# Patient Record
Sex: FEMALE | Race: BLACK OR AFRICAN AMERICAN | NOT HISPANIC OR LATINO | Employment: UNEMPLOYED | URBAN - METROPOLITAN AREA
[De-identification: names, ages, dates, MRNs, and addresses within clinical notes are randomized per-mention and may not be internally consistent; named-entity substitution may affect disease eponyms.]

---

## 2017-09-26 ENCOUNTER — OFFICE VISIT (OUTPATIENT)
Dept: ENDOCRINOLOGY | Facility: CLINIC | Age: 52
End: 2017-09-26
Payer: COMMERCIAL

## 2017-09-26 ENCOUNTER — LAB VISIT (OUTPATIENT)
Dept: LAB | Facility: HOSPITAL | Age: 52
End: 2017-09-26
Payer: COMMERCIAL

## 2017-09-26 VITALS
HEIGHT: 67 IN | RESPIRATION RATE: 16 BRPM | BODY MASS INDEX: 33.19 KG/M2 | WEIGHT: 211.44 LBS | SYSTOLIC BLOOD PRESSURE: 118 MMHG | DIASTOLIC BLOOD PRESSURE: 80 MMHG

## 2017-09-26 DIAGNOSIS — R00.2 HEART PALPITATIONS: ICD-10-CM

## 2017-09-26 DIAGNOSIS — I10 ESSENTIAL HYPERTENSION: ICD-10-CM

## 2017-09-26 DIAGNOSIS — E05.00 GRAVES DISEASE: ICD-10-CM

## 2017-09-26 LAB
25(OH)D3+25(OH)D2 SERPL-MCNC: 31 NG/ML
ALBUMIN SERPL BCP-MCNC: 3.3 G/DL
ALP SERPL-CCNC: 51 U/L
ALT SERPL W/O P-5'-P-CCNC: 10 U/L
ANION GAP SERPL CALC-SCNC: 6 MMOL/L
AST SERPL-CCNC: 12 U/L
BASOPHILS # BLD AUTO: 0.02 K/UL
BASOPHILS NFR BLD: 0.3 %
BILIRUB SERPL-MCNC: 0.3 MG/DL
BUN SERPL-MCNC: 15 MG/DL
CALCIUM SERPL-MCNC: 9.6 MG/DL
CHLORIDE SERPL-SCNC: 103 MMOL/L
CO2 SERPL-SCNC: 28 MMOL/L
CREAT SERPL-MCNC: 0.9 MG/DL
DIFFERENTIAL METHOD: ABNORMAL
EOSINOPHIL # BLD AUTO: 0.1 K/UL
EOSINOPHIL NFR BLD: 0.9 %
ERYTHROCYTE [DISTWIDTH] IN BLOOD BY AUTOMATED COUNT: 16.5 %
EST. GFR  (AFRICAN AMERICAN): >60 ML/MIN/1.73 M^2
EST. GFR  (NON AFRICAN AMERICAN): >60 ML/MIN/1.73 M^2
GLUCOSE SERPL-MCNC: 99 MG/DL
HCT VFR BLD AUTO: 40.2 %
HGB BLD-MCNC: 12.9 G/DL
LYMPHOCYTES # BLD AUTO: 2.3 K/UL
LYMPHOCYTES NFR BLD: 33.8 %
MCH RBC QN AUTO: 25.9 PG
MCHC RBC AUTO-ENTMCNC: 32.1 G/DL
MCV RBC AUTO: 81 FL
MONOCYTES # BLD AUTO: 0.3 K/UL
MONOCYTES NFR BLD: 4.4 %
NEUTROPHILS # BLD AUTO: 4.1 K/UL
NEUTROPHILS NFR BLD: 60.6 %
PLATELET # BLD AUTO: 256 K/UL
PMV BLD AUTO: 11 FL
POTASSIUM SERPL-SCNC: 3.7 MMOL/L
PROT SERPL-MCNC: 7.1 G/DL
RBC # BLD AUTO: 4.99 M/UL
SODIUM SERPL-SCNC: 137 MMOL/L
T4 FREE SERPL-MCNC: 0.89 NG/DL
TSH SERPL DL<=0.005 MIU/L-ACNC: 0.27 UIU/ML
WBC # BLD AUTO: 6.75 K/UL

## 2017-09-26 PROCEDURE — 85025 COMPLETE CBC W/AUTO DIFF WBC: CPT

## 2017-09-26 PROCEDURE — 99999 PR PBB SHADOW E&M-NEW PATIENT-LVL III: CPT | Mod: PBBFAC,,, | Performed by: INTERNAL MEDICINE

## 2017-09-26 PROCEDURE — 84443 ASSAY THYROID STIM HORMONE: CPT

## 2017-09-26 PROCEDURE — 84439 ASSAY OF FREE THYROXINE: CPT

## 2017-09-26 PROCEDURE — 80053 COMPREHEN METABOLIC PANEL: CPT

## 2017-09-26 PROCEDURE — 99204 OFFICE O/P NEW MOD 45 MIN: CPT | Mod: S$GLB,,, | Performed by: INTERNAL MEDICINE

## 2017-09-26 PROCEDURE — 3008F BODY MASS INDEX DOCD: CPT | Mod: S$GLB,,, | Performed by: INTERNAL MEDICINE

## 2017-09-26 PROCEDURE — 82306 VITAMIN D 25 HYDROXY: CPT

## 2017-09-26 PROCEDURE — 83520 IMMUNOASSAY QUANT NOS NONAB: CPT

## 2017-09-26 RX ORDER — METOPROLOL SUCCINATE 100 MG/1
100 TABLET, EXTENDED RELEASE ORAL 2 TIMES DAILY
COMMUNITY
End: 2020-09-02

## 2017-09-26 NOTE — LETTER
October 1, 2017      Duong Ravi MD  [address] [place] [address]           [facility] - Endo/Diab/Metab  [address]  [facility] [address]  Phone: [phone]  Fax: [phone]          Patient: Jaimie Goldberg   MR Number: [id number]   YOB: 1965   Date of Visit: 9/26/2017       Dear Dr. Duong Ravi:    Thank you for referring Jaimie Goldberg to me for evaluation. Attached you will find relevant portions of my assessment and plan of care.    If you have questions, please do not hesitate to call me. I look forward to following Jaimie Goldberg along with you.    Sincerely,    Dorcas Hatfield, NP    Enclosure  CC:  No Recipients    If you would like to receive this communication electronically, please contact [email or web address] or [phone] to request more information on [employer] access.    For providers and/or their staff who would like to refer a patient to [facility], please contact us through our one-stop-shop provider referral line, [facility], at [phone].    If you feel you have received this communication in error or would no longer like to receive these types of communications, please e-mail [email or web address]

## 2017-09-26 NOTE — PROGRESS NOTES
Subjective:       Patient ID: Jaimie Goldberg is a 52 y.o. female.    Chief Complaint: Graves' Disease    HPI   Ms. Goldberg is here today for evaluation of Hyperthyroidism   She is from [place], [place]     She is referred to our office by an outside PCP     She presented to her PCP with c/o palpitations which prompted initial evaluation of TFTs   She was found to have a suppressed TSH level     Repeat labs including thyroid antibodies showed an elevated TRAB     She was also referred to Cardiology and is currently wearing a holter monitor due to c/o palpitations    The patient states palpitations mainly occur at night or while at rest   She denies weight loss   Denies tremors   Denies hair loss or thinning nails   Denies heat intolerance   Denies anxiety   Denies tremors   Denies vision changes   Denies diarrhea     She endorses weight gain of 20 pounds since 10/2016     The patient denies fever or sore throat   Denies recent contrast   Denies use of amiodarone     There is no known family history of thyroid disease or thyroid cancer     Review of Systems   Constitutional: Positive for unexpected weight change. Negative for fatigue.   HENT: Negative for sore throat, trouble swallowing and voice change.    Eyes: Negative for visual disturbance.   Respiratory: Negative for shortness of breath.    Cardiovascular: Positive for palpitations. Negative for chest pain.   Gastrointestinal: Negative for abdominal pain, diarrhea, nausea and vomiting.   Endocrine: Negative for cold intolerance and heat intolerance.   Genitourinary: Negative for dysuria.   Musculoskeletal: Negative for arthralgias and neck pain.   Skin: Negative for wound.   Neurological: Negative for tremors and headaches.   Hematological: Does not bruise/bleed easily.   Psychiatric/Behavioral: Negative for confusion and sleep disturbance.       Objective:      Physical Exam   Constitutional: She is oriented to person, place, and time. She appears  well-developed. No distress.   HENT:   Right Ear: External ear normal.   Left Ear: External ear normal.   Nose: Nose normal.   Hearing normal  Dentition good    Eyes:   No exophthalmos     Neck: No tracheal deviation present. No thyromegaly present.   No bruit   No palpable thyroid nodules    Cardiovascular: Normal rate and regular rhythm.    No murmur heard.  Pulmonary/Chest: Effort normal and breath sounds normal.   Abdominal: Soft. There is no tenderness. No hernia.   Musculoskeletal: Normal range of motion. She exhibits no edema.   Gait normal  No clubbing or cyanosis noted   Lymphadenopathy:     She has no cervical adenopathy.   Neurological: She is alert and oriented to person, place, and time.   Skin: Skin is warm and dry. No rash noted.   No nodules       Psychiatric: She has a normal mood and affect. Judgment normal.   Nursing note and vitals reviewed.      Labs 9/14/2017:  From [facility]   TSH - 0.296 uIU/mL ( 0.358-3.740)  Free T4 - 0.89 ng/mL ( 0.76-1.46)  TRAB - 2.13 IU/L ( 0.00-1.75)    Assessment:       1. Graves disease    2. Essential hypertension    3. Heart palpitations    4. Menopausal disorder        Plan:       1. Graves disease - with positive TRAB on outside labs   -- recommend to repeat labs today including TRAB   -- differential includes toxic nodule, thyroiditis   -- discussed treatment options including use of ATDs, STEWARD or surgery with surgery being the least desirable   -- if repeat TRAB is positive, will start Methimazole. Discussed use and possible side effects  -- if repeat TRAB is negative, will plan for NM thyroid uptake and scan  -- cause specific treatment options and associated risks discussed with patient    -- discussed the chance of Graves remission (30%) after 2 years of ATD therapy - with a 50% chance of recurrence  -- check vitamin D and BMD as hyperthyroidism can accelerate bone loss     2. HTN   -- stable and controlled with current  regimen   -- encouraged to follow a low salt diet     3. Heart palpitations   -- continue to follow with outside cardiology as previously advised by outside PCP       Labs today   We will notify the patient with results       Case discussed in consultation with Dr. Wilson   Recommendations were discussed with the patient in detail  The patient verbalized understanding and agrees with the plan outlined as above.       Orders Placed This Encounter   Procedures    DXA Bone Density Spine And Hip     Standing Status:   Future     Standing Expiration Date:   10/1/2018     Order Specific Question:   Is the patient pregnant?     Answer:   No     Order Specific Question:   May the Radiologist modify the order per protocol to meet the clinical needs of the patient?     Answer:   Yes    TSH     Standing Status:   Future     Number of Occurrences:   1     Standing Expiration Date:   9/21/2018    T4, free     Standing Status:   Future     Number of Occurrences:   1     Standing Expiration Date:   11/25/2018    Thyrotropin receptor antibody     Standing Status:   Future     Number of Occurrences:   1     Standing Expiration Date:   11/25/2018    Vitamin D     Standing Status:   Future     Number of Occurrences:   1     Standing Expiration Date:   11/25/2018    Comprehensive metabolic panel     Standing Status:   Future     Number of Occurrences:   1     Standing Expiration Date:   9/21/2018    CBC auto differential     Standing Status:   Future     Number of Occurrences:   1     Standing Expiration Date:   9/21/2018

## 2017-09-28 LAB — TSH RECEP AB SER-ACNC: 1.58 IU/L

## 2017-10-01 ENCOUNTER — TELEPHONE (OUTPATIENT)
Dept: ENDOCRINOLOGY | Facility: CLINIC | Age: 52
End: 2017-10-01

## 2017-10-01 DIAGNOSIS — E05.90 HYPERTHYROIDISM: Primary | ICD-10-CM

## 2017-10-01 PROBLEM — R00.2 HEART PALPITATIONS: Status: ACTIVE | Noted: 2017-10-01

## 2017-10-01 PROBLEM — N95.9 MENOPAUSAL DISORDER: Status: ACTIVE | Noted: 2017-10-01

## 2017-10-01 PROBLEM — I10 HYPERTENSION: Status: ACTIVE | Noted: 2017-10-01

## 2017-10-09 NOTE — TELEPHONE ENCOUNTER
Spoke with pt. Pt would like a handout mailed to her to get more information on the uptake and scan before she decides. She would also like to speak with Mrs. Kaycee Driscoll with other questions.

## 2017-10-12 NOTE — TELEPHONE ENCOUNTER
Patient notified via phone. Discussed test results in detail. TRAB is negative with our assay. Will plan for NM uptake and scan. Discussed procedure in detail she voiced understanding. Requests to be scheduled ASAP. Also request info to be mailed.

## 2017-10-17 ENCOUNTER — TELEPHONE (OUTPATIENT)
Dept: ENDOCRINOLOGY | Facility: CLINIC | Age: 52
End: 2017-10-17

## 2017-10-17 NOTE — TELEPHONE ENCOUNTER
----- Message from [place] sent at 10/16/2017  2:46 PM CDT -----  Contact: self  Pt would like to discuss scheduling her procedure for her thyroid and the bone density.  She can be reached at [phone] or [phone]

## 2017-10-20 ENCOUNTER — TELEPHONE (OUTPATIENT)
Dept: ENDOCRINOLOGY | Facility: CLINIC | Age: 52
End: 2017-10-20

## 2017-10-20 NOTE — TELEPHONE ENCOUNTER
----- Message from No Lorenzo sent at 10/20/2017  2:32 PM CDT -----  Contact: self  Pt would like to be called back regarding a medicine, and a thyroid scan that is supposed to be done. Pt stated that she would like a call back asap because it is very important.    Pt can be contacted at [phone].

## 2017-10-25 ENCOUNTER — TELEPHONE (OUTPATIENT)
Dept: RADIOLOGY | Facility: HOSPITAL | Age: 52
End: 2017-10-25

## 2017-10-26 ENCOUNTER — HOSPITAL ENCOUNTER (OUTPATIENT)
Dept: RADIOLOGY | Facility: HOSPITAL | Age: 52
Discharge: HOME | End: 2017-10-26
Attending: INTERNAL MEDICINE
Payer: COMMERCIAL

## 2017-10-26 ENCOUNTER — HOSPITAL ENCOUNTER (OUTPATIENT)
Dept: RADIOLOGY | Facility: CLINIC | Age: 52
Discharge: HOME | End: 2017-10-26
Attending: INTERNAL MEDICINE
Payer: COMMERCIAL

## 2017-10-26 DIAGNOSIS — E05.00 GRAVES DISEASE: ICD-10-CM

## 2017-10-26 DIAGNOSIS — E05.90 HYPERTHYROIDISM: ICD-10-CM

## 2017-10-26 PROCEDURE — 77080 DXA BONE DENSITY AXIAL: CPT | Mod: 26,,, | Performed by: INTERNAL MEDICINE

## 2017-10-26 PROCEDURE — 78014 THYROID IMAGING W/BLOOD FLOW: CPT | Mod: 26,,, | Performed by: RADIOLOGY

## 2017-10-26 PROCEDURE — 77080 DXA BONE DENSITY AXIAL: CPT | Mod: TC

## 2017-10-27 ENCOUNTER — TELEPHONE (OUTPATIENT)
Dept: ENDOCRINOLOGY | Facility: CLINIC | Age: 52
End: 2017-10-27

## 2017-10-27 ENCOUNTER — HOSPITAL ENCOUNTER (OUTPATIENT)
Dept: RADIOLOGY | Facility: HOSPITAL | Age: 52
Discharge: HOME | End: 2017-10-27
Attending: INTERNAL MEDICINE
Payer: COMMERCIAL

## 2017-10-27 DIAGNOSIS — E05.10 TOXIC ADENOMA: ICD-10-CM

## 2017-10-27 PROBLEM — E05.00 GRAVES DISEASE: Status: RESOLVED | Noted: 2017-09-26 | Resolved: 2017-10-27

## 2017-10-27 PROBLEM — E05.90 HYPERTHYROIDISM: Status: RESOLVED | Noted: 2017-10-01 | Resolved: 2017-10-27

## 2017-10-27 PROCEDURE — A9509 IODINE I-123 SOD IODIDE MIL: HCPCS

## 2017-10-27 NOTE — TELEPHONE ENCOUNTER
Patient notified of NM thyroid uptake and scan results which revealed 2 hyperfunctioning thyroid nodules on the right. Recommended treatment dose is 33 mCi of I-131 orally. Discussed in detail with the patient and her spouse. They both verbalized understanding. Will proceed with I-131

## 2017-11-01 ENCOUNTER — TELEPHONE (OUTPATIENT)
Dept: ENDOCRINOLOGY | Facility: CLINIC | Age: 52
End: 2017-11-01

## 2017-11-01 DIAGNOSIS — E05.10 TOXIC ADENOMA: Primary | ICD-10-CM

## 2017-11-03 ENCOUNTER — TELEPHONE (OUTPATIENT)
Dept: ENDOCRINOLOGY | Facility: CLINIC | Age: 52
End: 2017-11-03

## 2017-11-03 DIAGNOSIS — E05.10 TOXIC ADENOMA: Primary | ICD-10-CM

## 2017-11-15 ENCOUNTER — TELEPHONE (OUTPATIENT)
Dept: RADIOLOGY | Facility: HOSPITAL | Age: 52
End: 2017-11-15

## 2017-11-15 DIAGNOSIS — E05.10 TOXIC ADENOMA: Primary | ICD-10-CM

## 2017-11-16 ENCOUNTER — HOSPITAL ENCOUNTER (OUTPATIENT)
Dept: RADIOLOGY | Facility: HOSPITAL | Age: 52
Discharge: HOME | End: 2017-11-16
Attending: INTERNAL MEDICINE
Payer: COMMERCIAL

## 2017-11-16 DIAGNOSIS — E05.10 TOXIC ADENOMA: ICD-10-CM

## 2017-11-16 PROCEDURE — 79005 NUCLEAR RX ORAL ADMIN: CPT | Mod: 26,,, | Performed by: NUCLEAR MEDICINE

## 2017-11-16 PROCEDURE — 79005 NUCLEAR RX ORAL ADMIN: CPT | Mod: TC

## 2017-11-16 NOTE — TELEPHONE ENCOUNTER
Received a call from Joyce in Nuclear Medicine regarding patient being here for I131 states she had a hysterectomy, however there is no record in the chart.  I spoke with Dorcas Driscoll NP which explains that, she ordered a pregnancy test for patient.  I lost connection with Joyce and tried calling her back,, left voice mail message on NM ext. # [phone] for Joyce to call me back if she has any questions.

## 2017-11-17 ENCOUNTER — TELEPHONE (OUTPATIENT)
Dept: ENDOCRINOLOGY | Facility: CLINIC | Age: 52
End: 2017-11-17

## 2017-11-17 DIAGNOSIS — E05.10 TOXIC ADENOMA: Primary | ICD-10-CM

## 2017-11-20 ENCOUNTER — TELEPHONE (OUTPATIENT)
Dept: ENDOCRINOLOGY | Facility: CLINIC | Age: 52
End: 2017-11-20

## 2017-11-20 DIAGNOSIS — E05.10 TOXIC ADENOMA: Primary | ICD-10-CM

## 2017-11-20 NOTE — TELEPHONE ENCOUNTER
----- Message from Meche Kaiser sent at 11/20/2017  9:01 AM CST -----  Contact: Self [phone]   PT called for next steps after radioactive pill

## 2017-12-04 ENCOUNTER — TELEPHONE (OUTPATIENT)
Dept: ENDOCRINOLOGY | Facility: CLINIC | Age: 52
End: 2017-12-04

## 2017-12-04 NOTE — TELEPHONE ENCOUNTER
----- Message from Bhavin Zavala sent at 12/4/2017  3:00 PM CST -----  Contact: patient   Patient called in requesting to speak with Dr. Hatfield. Patient called stating that its very important that she talks with her. Patient's number is [phone]. Thank You.

## 2017-12-05 ENCOUNTER — TELEPHONE (OUTPATIENT)
Dept: ENDOCRINOLOGY | Facility: CLINIC | Age: 52
End: 2017-12-05

## 2017-12-05 NOTE — TELEPHONE ENCOUNTER
----- Message from Gricelda Patel sent at 12/5/2017  1:03 PM CST -----  Contact: Pt   [phone]  Bon   -   Pt needs to speak with the nurse , no additional information was provided call back to discuss

## 2017-12-05 NOTE — TELEPHONE ENCOUNTER
----- Message from Meche Kaiser sent at 12/5/2017  8:10 AM CST -----  Contact: Self   PT is requesting a call. She states her heart is racing and she has not been able to sleep.     [phone] (h)  [phone] (m)

## 2017-12-06 ENCOUNTER — TELEPHONE (OUTPATIENT)
Dept: ENDOCRINOLOGY | Facility: CLINIC | Age: 52
End: 2017-12-06

## 2017-12-06 DIAGNOSIS — E66.9 OBESITY (BMI 30.0-34.9): Primary | ICD-10-CM

## 2017-12-06 NOTE — TELEPHONE ENCOUNTER
"Patient notified via phone. She states she is experiencing "racing heart" at night before going to bed. I asked patient to assess her pulse rate - she states current pulse is 69 bpm. Patient informed to take Toprol bid.  She is scheduled for follow up labs on 12/15/2017.  Advised to notify office on Friday if she continues to experience palpitations with the increase in Toprol. She voiced understanding of the above information   "

## 2017-12-14 ENCOUNTER — TELEPHONE (OUTPATIENT)
Dept: ENDOCRINOLOGY | Facility: CLINIC | Age: 52
End: 2017-12-14

## 2017-12-14 NOTE — TELEPHONE ENCOUNTER
----- Message from Gricelda Patel sent at 12/14/2017 12:34 PM CST -----  Contact: Pt   [phone]  Bon  /   Patient  Calling to verify orders for labs that schedule on 12/15, call the pt back to verify

## 2017-12-15 ENCOUNTER — LAB VISIT (OUTPATIENT)
Dept: LAB | Facility: HOSPITAL | Age: 52
End: 2017-12-15
Payer: COMMERCIAL

## 2017-12-15 DIAGNOSIS — E66.9 OBESITY (BMI 30.0-34.9): ICD-10-CM

## 2017-12-15 DIAGNOSIS — E05.10 TOXIC ADENOMA: ICD-10-CM

## 2017-12-15 LAB
ESTIMATED AVG GLUCOSE: 108 MG/DL
HBA1C MFR BLD HPLC: 5.4 %
T4 FREE SERPL-MCNC: 1.29 NG/DL
TSH SERPL DL<=0.005 MIU/L-ACNC: <0.01 UIU/ML

## 2017-12-15 PROCEDURE — 84439 ASSAY OF FREE THYROXINE: CPT

## 2017-12-15 PROCEDURE — 84443 ASSAY THYROID STIM HORMONE: CPT

## 2017-12-15 PROCEDURE — 83036 HEMOGLOBIN GLYCOSYLATED A1C: CPT

## 2017-12-15 PROCEDURE — 36415 COLL VENOUS BLD VENIPUNCTURE: CPT

## 2017-12-18 ENCOUNTER — TELEPHONE (OUTPATIENT)
Dept: ENDOCRINOLOGY | Facility: CLINIC | Age: 52
End: 2017-12-18

## 2017-12-18 DIAGNOSIS — E05.10 TOXIC ADENOMA: Primary | ICD-10-CM

## 2018-01-02 ENCOUNTER — OFFICE VISIT (OUTPATIENT)
Dept: ENDOCRINOLOGY | Facility: CLINIC | Age: 53
End: 2018-01-02
Payer: COMMERCIAL

## 2018-01-02 VITALS
SYSTOLIC BLOOD PRESSURE: 118 MMHG | HEART RATE: 68 BPM | HEIGHT: 67 IN | BODY MASS INDEX: 33.74 KG/M2 | RESPIRATION RATE: 16 BRPM | DIASTOLIC BLOOD PRESSURE: 78 MMHG | WEIGHT: 214.94 LBS

## 2018-01-02 DIAGNOSIS — E05.10 TOXIC ADENOMA: ICD-10-CM

## 2018-01-02 DIAGNOSIS — Z92.3 STATUS POST RADIOACTIVE IODINE THYROID ABLATION: Primary | ICD-10-CM

## 2018-01-02 PROCEDURE — 99214 OFFICE O/P EST MOD 30 MIN: CPT | Mod: S$GLB,,, | Performed by: INTERNAL MEDICINE

## 2018-01-02 PROCEDURE — 99999 PR PBB SHADOW E&M-EST. PATIENT-LVL III: CPT | Mod: PBBFAC,,, | Performed by: INTERNAL MEDICINE

## 2018-01-02 NOTE — PROGRESS NOTES
Subjective:       Patient ID: Jaimie Goldberg is a 53 y.o. female.    Chief Complaint: Graves' Disease    HPI   Ms. Goldberg is here today s/p STEWARD for toxic adenoma. Thyroid uptake and scan identified 2 hyperfunctioning thyroid nodules within the right thyroid lobe. Uptake was 17.5%     She opted for STEWARD and received 34.0 mCi of I-131 orally on 11/16/2017     The patient tolerated the procedure well  without difficulty     She denies palpitations at this time   Denies unexpected changes in her weight     She denies constipation or diarrhea   Denies dry skin or hair loss     She denies anxiety or tremors     She denies family history of thyroid disease or thyroid cancer     Denies prior history of radiation exposure to her head, face or neck     She denies dysphagia   Denies shortness of breath in the recumbent position   Denies voice changes     She denies anterior neck pain or pressure     Review of Systems   Constitutional: Positive for unexpected weight change. Negative for fatigue.   HENT: Negative for sore throat, trouble swallowing and voice change.    Eyes: Negative for visual disturbance.   Respiratory: Negative for shortness of breath.    Cardiovascular: Negative for chest pain and palpitations.   Gastrointestinal: Negative for abdominal pain, diarrhea, nausea and vomiting.   Endocrine: Negative for cold intolerance and heat intolerance.   Genitourinary: Negative for dysuria.   Musculoskeletal: Negative for arthralgias and neck pain.   Skin: Negative for wound.   Neurological: Negative for tremors and headaches.   Psychiatric/Behavioral: Negative for sleep disturbance. The patient is not nervous/anxious.        Objective:      Physical Exam   Constitutional: She is oriented to person, place, and time. She appears well-developed. No distress.   Eyes: EOM are normal. Pupils are equal, round, and reactive to light.        Neck: No tracheal deviation present. No thyromegaly present.   No bruit   No palpable  thyroid nodules    Cardiovascular: Normal rate and regular rhythm.    No murmur heard.  Pulmonary/Chest: Effort normal.   Musculoskeletal: Normal range of motion. She exhibits no edema.   Lymphadenopathy:     She has no cervical adenopathy.   Neurological: She is alert and oriented to person, place, and time.   Skin: Skin is warm and dry. No rash noted.   No nodules       Psychiatric: She has a normal mood and affect. Her behavior is normal.   Nursing note and vitals reviewed.      Labs 9/14/2017:  From [facility]   TSH - 0.296 uIU/mL ( 0.358-3.740)  Free T4 - 0.89 ng/mL ( 0.76-1.46)  TRAB - 2.13 IU/L ( 0.00-1.75)      Results for ALBERTA VENTURA (MRN [id number]) as of 1/2/2018 15:38   Ref. Range 9/26/2017 17:02 11/16/2017 15:45 12/15/2017 14:20   TSH Latest Ref Range: 0.400 - 4.000 uIU/mL 0.267 (L)  <0.010 (L)   Free T4 Latest Ref Range: 0.71 - 1.51 ng/dL 0.89  1.29   Thyrotropin Receptor Ab Latest Ref Range: 0.00 - 1.75 IU/L 1.58       NM thyroid uptake and scan 10/27/2017:   Impression    2 hyperfunctioning nodules on the right. Uptake 17.5%. Recommended treatment dose 33 mCi iodine-131 orally.       Assessment:       1. Status post radioactive iodine thyroid ablation    2. Toxic adenoma        Plan:       1. Status post radioactive iodine thyroid treatment   -- doing well post therapy   -- monitor TFTs periodically   -- reviewed signs and symptoms of hypothyroidism   -- handouts provided   -- check labs in 4 weeks     2. Toxic adenoma   -- status post I-131 orally   -- obtain thyroid ultrasound for surveillance   -- reviewed indications for FNA: interval change, compressive symptoms       Orders Placed This Encounter   Procedures    US Soft Tissue Head Neck Thyroid     Standing Status:   Future     Standing Expiration Date:   1/2/2019     Order Specific Question:   May the Radiologist modify the order per protocol to meet the clinical needs of the patient?     Answer:   Yes     TSH     Standing Status:   Future     Standing Expiration Date:   12/28/2018    T4, free     Standing Status:   Future     Standing Expiration Date:   3/3/2019

## 2018-01-30 ENCOUNTER — LAB VISIT (OUTPATIENT)
Dept: LAB | Facility: HOSPITAL | Age: 53
End: 2018-01-30
Payer: COMMERCIAL

## 2018-01-30 ENCOUNTER — HOSPITAL ENCOUNTER (OUTPATIENT)
Dept: ENDOCRINOLOGY | Facility: CLINIC | Age: 53
Discharge: HOME | End: 2018-01-30
Attending: INTERNAL MEDICINE
Payer: COMMERCIAL

## 2018-01-30 DIAGNOSIS — E05.10 TOXIC ADENOMA: ICD-10-CM

## 2018-01-30 DIAGNOSIS — Z92.3 STATUS POST RADIOACTIVE IODINE THYROID ABLATION: ICD-10-CM

## 2018-01-30 LAB
T4 FREE SERPL-MCNC: 0.52 NG/DL
TSH SERPL DL<=0.005 MIU/L-ACNC: 1.78 UIU/ML

## 2018-01-30 PROCEDURE — 84439 ASSAY OF FREE THYROXINE: CPT

## 2018-01-30 PROCEDURE — 84443 ASSAY THYROID STIM HORMONE: CPT

## 2018-01-30 PROCEDURE — 76536 US EXAM OF HEAD AND NECK: CPT | Mod: S$GLB,,, | Performed by: INTERNAL MEDICINE

## 2018-01-30 PROCEDURE — 36415 COLL VENOUS BLD VENIPUNCTURE: CPT

## 2018-01-31 ENCOUNTER — TELEPHONE (OUTPATIENT)
Dept: ENDOCRINOLOGY | Facility: CLINIC | Age: 53
End: 2018-01-31

## 2018-01-31 DIAGNOSIS — E89.0 POSTABLATIVE HYPOTHYROIDISM: ICD-10-CM

## 2018-01-31 RX ORDER — LEVOTHYROXINE SODIUM 50 UG/1
50 TABLET ORAL
Qty: 30 TABLET | Refills: 6 | Status: SHIPPED | OUTPATIENT
Start: 2018-01-31 | End: 2018-02-28 | Stop reason: DRUGHIGH

## 2018-02-01 ENCOUNTER — TELEPHONE (OUTPATIENT)
Dept: ENDOCRINOLOGY | Facility: CLINIC | Age: 53
End: 2018-02-01

## 2018-02-28 ENCOUNTER — TELEPHONE (OUTPATIENT)
Dept: ENDOCRINOLOGY | Facility: CLINIC | Age: 53
End: 2018-02-28

## 2018-02-28 ENCOUNTER — LAB VISIT (OUTPATIENT)
Dept: LAB | Facility: HOSPITAL | Age: 53
End: 2018-02-28
Payer: COMMERCIAL

## 2018-02-28 DIAGNOSIS — E89.0 POSTABLATIVE HYPOTHYROIDISM: Primary | ICD-10-CM

## 2018-02-28 DIAGNOSIS — E89.0 POSTABLATIVE HYPOTHYROIDISM: ICD-10-CM

## 2018-02-28 LAB
T4 FREE SERPL-MCNC: 0.72 NG/DL
TSH SERPL DL<=0.005 MIU/L-ACNC: 14.06 UIU/ML

## 2018-02-28 PROCEDURE — 84443 ASSAY THYROID STIM HORMONE: CPT

## 2018-02-28 PROCEDURE — 84439 ASSAY OF FREE THYROXINE: CPT

## 2018-02-28 PROCEDURE — 36415 COLL VENOUS BLD VENIPUNCTURE: CPT

## 2018-02-28 RX ORDER — LEVOTHYROXINE SODIUM 75 UG/1
75 TABLET ORAL
Qty: 30 TABLET | Refills: 6 | Status: SHIPPED | OUTPATIENT
Start: 2018-02-28 | End: 2018-04-13 | Stop reason: DRUGHIGH

## 2018-03-05 ENCOUNTER — TELEPHONE (OUTPATIENT)
Dept: ENDOCRINOLOGY | Facility: CLINIC | Age: 53
End: 2018-03-05

## 2018-03-05 NOTE — TELEPHONE ENCOUNTER
----- Message from Gricelda Patel sent at 3/2/2018  2:54 PM CST -----  Contact: Self  [phone]  Bon  /   Patient  Needs to speak with the nurse in regards to result of last last , call the pt back to discuss   Thanks,

## 2018-03-05 NOTE — TELEPHONE ENCOUNTER
----- Message from Ela Justice sent at 3/5/2018  3:41 PM CST -----  Contact: Jaimie     tel: cell:  [phone]   Pt.says she would like for CONCHIS to return her call.    Conchis had left her a msg. For pt. To call her.  Asking for  Test results.

## 2018-03-06 ENCOUNTER — TELEPHONE (OUTPATIENT)
Dept: ENDOCRINOLOGY | Facility: CLINIC | Age: 53
End: 2018-03-06

## 2018-03-06 NOTE — TELEPHONE ENCOUNTER
----- Message from Ela Justice sent at 3/6/2018  2:38 PM CST -----  Contact: Mago    tel:   [phone]   Caller says she needs info on her lab work that you gave her.    Would like to speak to you to discuss the results so that she can understand them.

## 2018-04-12 ENCOUNTER — LAB VISIT (OUTPATIENT)
Dept: LAB | Facility: HOSPITAL | Age: 53
End: 2018-04-12
Payer: COMMERCIAL

## 2018-04-12 DIAGNOSIS — E89.0 POSTABLATIVE HYPOTHYROIDISM: ICD-10-CM

## 2018-04-12 LAB
T4 FREE SERPL-MCNC: 0.97 NG/DL
TSH SERPL DL<=0.005 MIU/L-ACNC: 7.78 UIU/ML

## 2018-04-12 PROCEDURE — 84443 ASSAY THYROID STIM HORMONE: CPT

## 2018-04-12 PROCEDURE — 36415 COLL VENOUS BLD VENIPUNCTURE: CPT

## 2018-04-12 PROCEDURE — 84439 ASSAY OF FREE THYROXINE: CPT

## 2018-04-13 ENCOUNTER — TELEPHONE (OUTPATIENT)
Dept: ENDOCRINOLOGY | Facility: CLINIC | Age: 53
End: 2018-04-13

## 2018-04-13 DIAGNOSIS — E89.0 POSTABLATIVE HYPOTHYROIDISM: Primary | ICD-10-CM

## 2018-04-13 RX ORDER — LEVOTHYROXINE SODIUM 88 UG/1
88 TABLET ORAL
Qty: 30 TABLET | Refills: 6 | Status: SHIPPED | OUTPATIENT
Start: 2018-04-13 | End: 2018-07-12 | Stop reason: SDUPTHER

## 2018-04-13 NOTE — TELEPHONE ENCOUNTER
Patient notified via phone with test results. She states she is feeling well. TSH has improved but still above goal. We will increase Levothyroxine to 88 mcg once daily. Patient is taking appropriately. Repeat labs in 6-8 weeks. She voiced understanding

## 2018-05-07 ENCOUNTER — TELEPHONE (OUTPATIENT)
Dept: ENDOCRINOLOGY | Facility: CLINIC | Age: 53
End: 2018-05-07

## 2018-05-07 NOTE — TELEPHONE ENCOUNTER
----- Message from Tonya Castillo sent at 5/7/2018  2:14 PM CDT -----  Contact: self  Pt is calling in regards to scheduling an appt for July. Books aren't currently open to schedule this appt. Per pt Wednesday or Friday does not work for pt, and anytime between 1:30 AND 2:30P. Pt would like a call back to do so.    Pt can be reached at [phone].    Thank you

## 2018-06-07 ENCOUNTER — LAB VISIT (OUTPATIENT)
Dept: LAB | Facility: HOSPITAL | Age: 53
End: 2018-06-07
Payer: COMMERCIAL

## 2018-06-07 DIAGNOSIS — E89.0 POSTABLATIVE HYPOTHYROIDISM: ICD-10-CM

## 2018-06-07 LAB — TSH SERPL DL<=0.005 MIU/L-ACNC: 1.72 UIU/ML

## 2018-06-07 PROCEDURE — 36415 COLL VENOUS BLD VENIPUNCTURE: CPT

## 2018-06-07 PROCEDURE — 84443 ASSAY THYROID STIM HORMONE: CPT

## 2018-07-12 ENCOUNTER — OFFICE VISIT (OUTPATIENT)
Dept: ENDOCRINOLOGY | Facility: CLINIC | Age: 53
End: 2018-07-12
Payer: COMMERCIAL

## 2018-07-12 VITALS
SYSTOLIC BLOOD PRESSURE: 120 MMHG | HEIGHT: 67 IN | BODY MASS INDEX: 34.36 KG/M2 | HEART RATE: 71 BPM | WEIGHT: 218.94 LBS | DIASTOLIC BLOOD PRESSURE: 80 MMHG

## 2018-07-12 DIAGNOSIS — E55.9 VITAMIN D DEFICIENCY: ICD-10-CM

## 2018-07-12 DIAGNOSIS — E89.0 POSTABLATIVE HYPOTHYROIDISM: Primary | ICD-10-CM

## 2018-07-12 DIAGNOSIS — E04.2 MULTIPLE THYROID NODULES: ICD-10-CM

## 2018-07-12 DIAGNOSIS — E66.9 OBESITY, CLASS I, BMI 30.0-34.9 (SEE ACTUAL BMI): ICD-10-CM

## 2018-07-12 PROBLEM — E05.10 TOXIC ADENOMA: Status: RESOLVED | Noted: 2017-10-27 | Resolved: 2018-07-12

## 2018-07-12 PROBLEM — E66.811 OBESITY, CLASS I, BMI 30.0-34.9 (SEE ACTUAL BMI): Status: ACTIVE | Noted: 2018-07-12

## 2018-07-12 PROBLEM — R00.2 HEART PALPITATIONS: Status: RESOLVED | Noted: 2017-10-01 | Resolved: 2018-07-12

## 2018-07-12 PROCEDURE — 99214 OFFICE O/P EST MOD 30 MIN: CPT | Mod: S$GLB,,, | Performed by: INTERNAL MEDICINE

## 2018-07-12 PROCEDURE — 3079F DIAST BP 80-89 MM HG: CPT | Mod: CPTII,S$GLB,, | Performed by: INTERNAL MEDICINE

## 2018-07-12 PROCEDURE — 99999 PR PBB SHADOW E&M-EST. PATIENT-LVL III: CPT | Mod: PBBFAC,,, | Performed by: INTERNAL MEDICINE

## 2018-07-12 PROCEDURE — 3008F BODY MASS INDEX DOCD: CPT | Mod: CPTII,S$GLB,, | Performed by: INTERNAL MEDICINE

## 2018-07-12 PROCEDURE — 3074F SYST BP LT 130 MM HG: CPT | Mod: CPTII,S$GLB,, | Performed by: INTERNAL MEDICINE

## 2018-07-12 RX ORDER — LEVOTHYROXINE SODIUM 88 UG/1
88 TABLET ORAL
Qty: 30 TABLET | Refills: 11 | Status: SHIPPED | OUTPATIENT
Start: 2018-07-12 | End: 2019-07-16 | Stop reason: SDUPTHER

## 2018-07-12 RX ORDER — ERGOCALCIFEROL 1.25 MG/1
50000 CAPSULE ORAL
COMMUNITY
End: 2018-07-12 | Stop reason: DRUGHIGH

## 2018-07-12 RX ORDER — VIT C/E/ZN/COPPR/LUTEIN/ZEAXAN 250MG-90MG
5000 CAPSULE ORAL DAILY
Qty: 90 CAPSULE | Refills: 0
Start: 2018-07-12 | End: 2019-07-16

## 2018-07-12 NOTE — PROGRESS NOTES
Subjective:       Patient ID: Jaimie Goldberg is a 53 y.o. female.    Chief Complaint: Postablative Hypothyroidism     HPI   Ms. Goldberg is here today for Hypothyroidism follow up.     She is s/p STEWARD for toxic adenoma. Thyroid uptake and scan identified 2 hyperfunctioning thyroid nodules within the right thyroid lobe. Uptake was 17.5%     She opted for STEWARD and received 34.0 mCi of I-131 orally on 11/16/2017     The patient tolerated the procedure well  without difficulty     She became Hypothyroid in 02/2018 with a TSH of 14.065    Currently taking Levothyroxine 88 mcg once daily     She is taking appropriately. Every morning with a full glass of water on an empty stomach 30 minutes before eating or taking other meds     She denies palpitations at this time   Has noticed an increase in weight     She denies constipation or diarrhea   Denies dry skin or hair loss     She denies anxiety or tremors     She denies family history of thyroid disease or thyroid cancer     Denies prior history of radiation exposure to her head, face or neck     She denies dysphagia   Denies shortness of breath in the recumbent position   Denies voice changes     She denies anterior neck pain or pressure     Review of Systems   Constitutional: Positive for unexpected weight change. Negative for fatigue.   HENT: Negative for sore throat, trouble swallowing and voice change.    Eyes: Negative for visual disturbance.   Respiratory: Negative for shortness of breath.    Cardiovascular: Negative for chest pain and palpitations.   Gastrointestinal: Negative for abdominal pain, diarrhea, nausea and vomiting.   Endocrine: Negative for cold intolerance and heat intolerance.   Genitourinary: Negative for dysuria.   Musculoskeletal: Negative for arthralgias and neck pain.   Skin: Negative for wound.   Neurological: Negative for tremors and headaches.   Psychiatric/Behavioral: Negative for sleep disturbance. The patient is not nervous/anxious.         Objective:      Physical Exam   Constitutional: She is oriented to person, place, and time. She appears well-developed.   Eyes:        Neck: No tracheal deviation present. No thyromegaly present.   No palpable thyroid nodules    Cardiovascular: Normal rate and regular rhythm.    No murmur heard.  Pulmonary/Chest: Effort normal.   Musculoskeletal: Normal range of motion. She exhibits no edema.   Lymphadenopathy:     She has no cervical adenopathy.   Neurological: She is alert and oriented to person, place, and time.   Skin: Skin is warm and dry. No rash noted.   No nodules       Psychiatric: She has a normal mood and affect. Her behavior is normal.   Nursing note and vitals reviewed.      Labs 9/14/2017:  From [facility]   TSH - 0.296 uIU/mL ( 0.358-3.740)  Free T4 - 0.89 ng/mL ( 0.76-1.46)  TRAB - 2.13 IU/L ( 0.00-1.75)    Results for ALBERTA VENTURA (MRN [id number]) as of 7/12/2018 13:03   Ref. Range 2/28/2018 14:00 4/12/2018 13:46 6/7/2018 14:08   TSH Latest Ref Range: 0.400 - 4.000 uIU/mL 14.065 (H) 7.783 (H) 1.720   Free T4 Latest Ref Range: 0.71 - 1.51 ng/dL 0.72 0.97      NM thyroid uptake and scan 10/27/2017:   Impression    2 hyperfunctioning nodules on the right. Uptake 17.5%. Recommended treatment dose 33 mCi iodine-131 orally.     Thyroid USS from 01/2018:   THYROID GLAND is not enlarged.  Vascularity is normal.    RIGHT LOBE of the thyroid measures:4.06x1.24x1.48 cm.  1) inferior nodule measures 0.94x0.71x0.58 cm. Isoechoic.  2)Superior nodule measures 0.96x0.8x0.87 cm.  ISTHMUS:0.21 cm.    LEFT LOBE measures:3.83x0.9x1.22 cm.  LYMPH NODES:Benign.    COMPARISON:7/25/2016   Impression     Stable nodular goiter    RECOMMENDATIONS: Repeat ultrasound in 2 years.     Assessment:       1. Postablative hypothyroidism    2. Multiple thyroid nodules    3. Vitamin D deficiency    4. Obesity, Class I, BMI 30.0-34.9 (see actual BMI)        Plan:       1. Postablative Hypothyroidism    -- clinically and biochemically euthyroid   -- continue Levothyroxine 88 mcg once daily   -- reviewed usual times of thyroid hormone changes   -- avoid exogenous hyperthyroidism as this can accelerate bone loss and increase risk of CV complications     2. Multiple thyroid nodules   -- stable   -- nodules do not meet criteria for FNA   -- recommend repeat study in 2 years ( due 2020)   -- reviewed indications for FNA: interval change, compressive symptoms     3. Vitamin d def   -- continue replacement therapy as prescribed     4. Obesity   -- alerted as to the increased of T2DM   -- encouraged dietary and lifestyle modifications   -- continue weight loss initiatives   -- provided with diabetes management guide book   -- increase exercise as tolerated       Orders Placed This Encounter   Procedures    TSH     Standing Status:   Future     Standing Expiration Date:   7/7/2019    Vitamin D     Standing Status:   Future     Standing Expiration Date:   9/10/2019

## 2018-10-04 ENCOUNTER — TELEPHONE (OUTPATIENT)
Dept: ENDOCRINOLOGY | Facility: CLINIC | Age: 53
End: 2018-10-04

## 2018-10-04 NOTE — TELEPHONE ENCOUNTER
----- Message from Meche Kaiser sent at 10/4/2018 10:02 AM CDT -----  Contact: Self [phone] (H) [phone] (M)  PT has some questions about estrogens, conjugated, (PREMARIN) a medication that was prescribed by another provider.

## 2019-07-08 ENCOUNTER — TELEPHONE (OUTPATIENT)
Dept: ENDOCRINOLOGY | Facility: CLINIC | Age: 54
End: 2019-07-08

## 2019-07-08 DIAGNOSIS — E03.9 HYPOTHYROIDISM, UNSPECIFIED TYPE: Primary | ICD-10-CM

## 2019-07-08 NOTE — TELEPHONE ENCOUNTER
Explained to patient better to do labs prior to visit.  Labs scheduled in [place] on July 10th for 1:30PM

## 2019-07-08 NOTE — TELEPHONE ENCOUNTER
----- Message from Meche Kaiser sent at 7/8/2019  8:23 AM CDT -----  Contact: Self [phone]  Liu BUCIO is requesting orders for labs. Pls schedule Friday at [facility].

## 2019-07-10 ENCOUNTER — LAB VISIT (OUTPATIENT)
Dept: LAB | Facility: HOSPITAL | Age: 54
End: 2019-07-10
Attending: INTERNAL MEDICINE
Payer: COMMERCIAL

## 2019-07-10 DIAGNOSIS — E55.9 VITAMIN D DEFICIENCY: ICD-10-CM

## 2019-07-10 DIAGNOSIS — E03.9 HYPOTHYROIDISM, UNSPECIFIED TYPE: ICD-10-CM

## 2019-07-10 LAB
25(OH)D3+25(OH)D2 SERPL-MCNC: 69 NG/ML (ref 30–96)
TSH SERPL DL<=0.005 MIU/L-ACNC: 2.12 UIU/ML (ref 0.4–4)

## 2019-07-10 PROCEDURE — 84443 ASSAY THYROID STIM HORMONE: CPT

## 2019-07-10 PROCEDURE — 36415 COLL VENOUS BLD VENIPUNCTURE: CPT | Mod: PO

## 2019-07-10 PROCEDURE — 82306 VITAMIN D 25 HYDROXY: CPT

## 2019-07-12 NOTE — PROGRESS NOTES
Subjective:      Patient ID: Jaimie Goldberg is a 54 y.o. female.    Chief Complaint:  Thyroid Nodule    History of Present Illness  Jaimie Goldberg presents today for follow up of hypothyroidism. Previous patient of PURNIMA Driscoll NP. Last seen in 7/2018. This is her first visit with me.     She is s/p STEWARD for toxic adenoma. Thyroid uptake and scan identified 2 hyperfunctioning thyroid nodules within the right thyroid lobe. Uptake was 17.5%      She opted for STEWARD and received 34.0 mCi of I-131 orally on 11/16/2017      The patient tolerated the procedure well  without difficulty      She became Hypothyroid in 02/2018 with a TSH of 14.065     Currently taking Levothyroxine 88 mcg once daily      She is taking appropriately. Every morning with a full glass of water on an empty stomach 30 minutes before eating or taking other meds.       She denies palpitations at this time   + weight gain   Denies constipation or diarrhea   Denies dry skin or hair loss    Denies anxiety or tremors      She denies family history of thyroid disease or thyroid cancer      Denies prior history of radiation exposure to her head, face or neck      She denies dysphagia   Denies shortness of breath in the recumbent position   Denies voice changes      She denies anterior neck pain or pressure     With regards to the vitamin d deficiency:  MVI  No recent fractures     Review of Systems   Constitutional: Positive for unexpected weight change (gain).   Eyes: Negative for visual disturbance.   Respiratory: Negative for shortness of breath.    Cardiovascular: Negative for chest pain.   Gastrointestinal: Negative for abdominal pain.   Endocrine: Negative for cold intolerance, heat intolerance, polydipsia, polyphagia and polyuria.   Musculoskeletal: Negative for arthralgias.   Skin: Negative for wound.   Neurological: Negative for headaches.   Hematological: Does not bruise/bleed easily.   Psychiatric/Behavioral: Negative for sleep disturbance.      Objective:   Physical Exam   Neck: No thyromegaly present.   Cardiovascular: Normal rate.   No edema present   Pulmonary/Chest: Effort normal.   Abdominal: Soft.   Vitals reviewed.    Body mass index is 34.79 kg/m².    Lab Review:   Labs 9/14/2017:  From [facility]   TSH - 0.296 uIU/mL ( 0.358-3.740)  Free T4 - 0.89 ng/mL ( 0.76-1.46)  TRAB - 2.13 IU/L ( 0.00-1.75)    Lab Results   Component Value Date    HGBA1C 5.4 12/15/2017     No results found for: CHOL, HDL, LDLCALC, TRIG, CHOLHDL  Lab Results   Component Value Date     09/26/2017    K 3.7 09/26/2017     09/26/2017    CO2 28 09/26/2017    GLU 99 09/26/2017    BUN 15 09/26/2017    CREATININE 0.9 09/26/2017    CALCIUM 9.6 09/26/2017    PROT 7.1 09/26/2017    ALBUMIN 3.3 (L) 09/26/2017    BILITOT 0.3 09/26/2017    ALKPHOS 51 (L) 09/26/2017    AST 12 09/26/2017    ALT 10 09/26/2017    ANIONGAP 6 (L) 09/26/2017    ESTGFRAFRICA >60.0 09/26/2017    EGFRNONAA >60.0 09/26/2017    TSH 2.116 07/10/2019     Vit D, 25-Hydroxy   Date Value Ref Range Status   07/10/2019 69 30 - 96 ng/mL Final     Comment:     Vitamin D deficiency.........<10 ng/mL                              Vitamin D insufficiency......10-29 ng/mL       Vitamin D sufficiency........> or equal to 30 ng/mL  Vitamin D toxicity............>100 ng/mL       NM thyroid uptake and scan 10/27/2017:   Impression    2 hyperfunctioning nodules on the right. Uptake 17.5%. Recommended treatment dose 33 mCi iodine-131 orally.      Thyroid USS from 01/2018:   THYROID GLAND is not enlarged.  Vascularity is normal.    RIGHT LOBE of the thyroid measures:4.06x1.24x1.48 cm.  1) inferior nodule measures 0.94x0.71x0.58 cm. Isoechoic.  2)Superior nodule measures 0.96x0.8x0.87 cm.  ISTHMUS:0.21 cm.    LEFT LOBE measures:3.83x0.9x1.22 cm.  LYMPH NODES:Benign.    COMPARISON:7/25/2016   Impression   Stable nodular goiter  RECOMMENDATIONS: Repeat ultrasound in 2 years     Assessment  and Plan     1. Postablative hypothyroidism  TSH    Comprehensive metabolic panel    levothyroxine (SYNTHROID) 88 MCG tablet   2. Multiple thyroid nodules  US Soft Tissue Head Neck Thyroid   3. Vitamin D deficiency  Vitamin D   4. Obesity, Class I, BMI 30.0-34.9 (see actual BMI)       Postablative Hypothyroidism   -- clinically and biochemically euthyroid   -- continue Levothyroxine 88 mcg once daily   -- reviewed usual times of thyroid hormone changes   -- avoid exogenous hyperthyroidism as this can accelerate bone loss and increase risk of CV complications      Multiple thyroid nodules   -- stable   -- nodules do not meet criteria for FNA   -- recommend repeat study in 1 year (due 2020)   -- reviewed indications for FNA: interval change, compressive symptoms      Vitamin d def   -- continue replacement therapy as prescribed      Obesity   -- alerted as to the increased of T2DM   -- encouraged dietary and lifestyle modifications   -- continue weight loss initiatives   -- provided with diabetes management guide book   -- increase exercise as tolerated     Follow up in about 1 year (around 7/16/2020).  Labs prior to next appointment with me

## 2019-07-16 ENCOUNTER — TELEPHONE (OUTPATIENT)
Dept: ENDOCRINOLOGY | Facility: CLINIC | Age: 54
End: 2019-07-16

## 2019-07-16 ENCOUNTER — OFFICE VISIT (OUTPATIENT)
Dept: ENDOCRINOLOGY | Facility: CLINIC | Age: 54
End: 2019-07-16
Payer: COMMERCIAL

## 2019-07-16 VITALS
HEART RATE: 88 BPM | SYSTOLIC BLOOD PRESSURE: 129 MMHG | BODY MASS INDEX: 34.87 KG/M2 | WEIGHT: 222.13 LBS | HEIGHT: 67 IN | DIASTOLIC BLOOD PRESSURE: 84 MMHG

## 2019-07-16 DIAGNOSIS — E04.2 MULTIPLE THYROID NODULES: ICD-10-CM

## 2019-07-16 DIAGNOSIS — E89.0 POSTABLATIVE HYPOTHYROIDISM: Primary | ICD-10-CM

## 2019-07-16 DIAGNOSIS — E55.9 VITAMIN D DEFICIENCY: ICD-10-CM

## 2019-07-16 DIAGNOSIS — E66.9 OBESITY, CLASS I, BMI 30.0-34.9 (SEE ACTUAL BMI): ICD-10-CM

## 2019-07-16 PROCEDURE — 99999 PR PBB SHADOW E&M-EST. PATIENT-LVL III: CPT | Mod: PBBFAC,,, | Performed by: NURSE PRACTITIONER

## 2019-07-16 PROCEDURE — 99214 PR OFFICE/OUTPT VISIT, EST, LEVL IV, 30-39 MIN: ICD-10-PCS | Mod: S$GLB,,, | Performed by: NURSE PRACTITIONER

## 2019-07-16 PROCEDURE — 3074F PR MOST RECENT SYSTOLIC BLOOD PRESSURE < 130 MM HG: ICD-10-PCS | Mod: CPTII,S$GLB,, | Performed by: NURSE PRACTITIONER

## 2019-07-16 PROCEDURE — 3008F BODY MASS INDEX DOCD: CPT | Mod: CPTII,S$GLB,, | Performed by: NURSE PRACTITIONER

## 2019-07-16 PROCEDURE — 99999 PR PBB SHADOW E&M-EST. PATIENT-LVL III: ICD-10-PCS | Mod: PBBFAC,,, | Performed by: NURSE PRACTITIONER

## 2019-07-16 PROCEDURE — 3079F PR MOST RECENT DIASTOLIC BLOOD PRESSURE 80-89 MM HG: ICD-10-PCS | Mod: CPTII,S$GLB,, | Performed by: NURSE PRACTITIONER

## 2019-07-16 PROCEDURE — 3008F PR BODY MASS INDEX (BMI) DOCUMENTED: ICD-10-PCS | Mod: CPTII,S$GLB,, | Performed by: NURSE PRACTITIONER

## 2019-07-16 PROCEDURE — 3079F DIAST BP 80-89 MM HG: CPT | Mod: CPTII,S$GLB,, | Performed by: NURSE PRACTITIONER

## 2019-07-16 PROCEDURE — 99214 OFFICE O/P EST MOD 30 MIN: CPT | Mod: S$GLB,,, | Performed by: NURSE PRACTITIONER

## 2019-07-16 PROCEDURE — 3074F SYST BP LT 130 MM HG: CPT | Mod: CPTII,S$GLB,, | Performed by: NURSE PRACTITIONER

## 2019-07-16 RX ORDER — HYDROCHLOROTHIAZIDE 25 MG/1
TABLET ORAL
COMMUNITY
Start: 2019-04-16

## 2019-07-16 RX ORDER — LEVOTHYROXINE SODIUM 88 UG/1
88 TABLET ORAL
Qty: 30 TABLET | Refills: 14 | Status: SHIPPED | OUTPATIENT
Start: 2019-07-16 | End: 2020-06-29 | Stop reason: SDUPTHER

## 2019-07-16 RX ORDER — MONTELUKAST SODIUM 10 MG/1
TABLET ORAL
COMMUNITY
Start: 2019-05-16

## 2019-07-16 RX ORDER — SIMVASTATIN 40 MG/1
TABLET, FILM COATED ORAL
COMMUNITY
Start: 2019-06-18

## 2019-07-16 RX ORDER — HYDROCHLOROTHIAZIDE 25 MG/1
25 TABLET ORAL DAILY
Refills: 2 | COMMUNITY
Start: 2019-07-06 | End: 2019-07-16 | Stop reason: SDUPTHER

## 2019-07-16 RX ORDER — LOSARTAN POTASSIUM 25 MG/1
TABLET ORAL
COMMUNITY
Start: 2019-05-16 | End: 2019-07-16 | Stop reason: SDUPTHER

## 2019-07-16 RX ORDER — LOSARTAN POTASSIUM 25 MG/1
25 TABLET ORAL DAILY
Refills: 9 | COMMUNITY
Start: 2019-06-21

## 2020-06-29 DIAGNOSIS — E89.0 POSTABLATIVE HYPOTHYROIDISM: ICD-10-CM

## 2020-06-29 RX ORDER — LEVOTHYROXINE SODIUM 88 UG/1
88 TABLET ORAL
Qty: 90 TABLET | Refills: 0 | Status: SHIPPED | OUTPATIENT
Start: 2020-06-29 | End: 2020-08-26 | Stop reason: SDUPTHER

## 2020-06-29 NOTE — TELEPHONE ENCOUNTER
----- Message from Martha Kaiser sent at 6/29/2020  1:25 PM CDT -----  Regarding: patient advice  Contact: pt @ [phone]  Pt asking to speak with someone in Dr. Garcia's office regarding her medication and appt. Please call.

## 2020-06-29 NOTE — TELEPHONE ENCOUNTER
Spoke to patient and she is scared to come in town with the covid going on right now and is asking for a refill on her Rx's.

## 2020-08-26 DIAGNOSIS — E89.0 POSTABLATIVE HYPOTHYROIDISM: ICD-10-CM

## 2020-08-26 RX ORDER — LEVOTHYROXINE SODIUM 88 UG/1
88 TABLET ORAL
Qty: 90 TABLET | Refills: 0 | Status: SHIPPED | OUTPATIENT
Start: 2020-08-26 | End: 2020-09-03 | Stop reason: SDUPTHER

## 2020-08-26 NOTE — TELEPHONE ENCOUNTER
----- Message from Olivia Alarcon sent at 8/26/2020 10:03 AM CDT -----  Pt states she need  refill on her levothyroxine (SYNTHROID) 88 MCG tablet  is a pt of khalida sehon    [facility] - [place], [place] - [address] [facility] [phone] (Phone)  [phone] (Fax)

## 2020-09-01 NOTE — PROGRESS NOTES
Subjective:      Patient ID: Jaimie Goldberg is a 55 y.o. female.    Chief Complaint:  Thyroid Problem    History of Present Illness  Jaimie Goldberg is here for follow up of hypothyroidism and thyroid nodule.  Previously seen by ARCELIA Garcia NP.  Last seen 7/16/2019.  This is their first visit with me.      With regards to hypothyroidism:    STEWARD for toxic adenoma  11/16/2017 received 34.0 mCi of I-131 orally    Lab Results   Component Value Date    TSH 2.116 07/10/2019    FREET4 0.97 04/12/2018     Current Medication:  LT4 88mcg daily    Takes thyroid medication properly without food first thing in the morning.    Current Symptoms:   - Weight gain  - Fatigue   - Constipation   - Hair loss  - Brittle nails  - Mental fog   - cp, palpations or sob  - Heat intolerance  - Cold intolerance    With regards to thyroid nodule:    Thyroid US: 1/30/2018  THYROID GLAND is not enlarged.  Vascularity is normal.  RIGHT LOBE of the thyroid measures:4.06x1.24x1.48 cm.  1) inferior nodule measures 0.94x0.71x0.58 cm. Isoechoic.  2)Superior nodule measures 0.96x0.8x0.87 cm.  ISTHMUS:0.21 cm.  LEFT LOBE measures:3.83x0.9x1.22 cm.  LYMPH NODES:Benign.  COMPARISON:7/25/2016  IMPRESSION:   Stable nodular goiter  RECOMMENDATIONS: Repeat ultrasound in 2 years.     FNA: None    Signs or Symptoms:   Difficulty breathing: Denies  Difficulty swallowing: Denies  Voice Changes: Denies  FH of thyroid cancer: Denies  Personal history of radiation treatment or exposure: Denies    Review of Systems   Constitutional: Negative for fatigue.   Eyes: Negative for visual disturbance.   Respiratory: Negative for shortness of breath.    Cardiovascular: Negative for chest pain.   Gastrointestinal: Negative for abdominal pain.   Musculoskeletal: Negative for arthralgias.   Skin: Negative for wound.   Neurological: Negative for headaches.   Hematological: Does not bruise/bleed easily.   Psychiatric/Behavioral: Negative for sleep disturbance.     Objective:  "  Physical Exam  Vitals signs reviewed.   Neck:      Thyroid: No thyromegaly (possible thyroid nodule palpable on right).   Cardiovascular:      Rate and Rhythm: Normal rate.      Comments: No edema present  Pulmonary:      Effort: Pulmonary effort is normal.   Abdominal:      Palpations: Abdomen is soft.         BMD 10/26/2017  Normal BMD of lumbar spine and hip  Recommendations:  1) Adequate calcium and Vitamin D therapy  2) Appropriate exercise  3) Consider repeat BMD at age 65    Visit Vitals  BP (!) 142/80   Pulse 76   Ht 5' 2" (1.575 m)   Wt 101 kg (222 lb 10.6 oz)   BMI 40.73 kg/m²       Body mass index is 40.73 kg/m².    Lab Review:   Lab Results   Component Value Date    HGBA1C 5.4 12/15/2017       No results found for: CHOL, HDL, LDLCALC, TRIG, CHOLHDL  Lab Results   Component Value Date     09/26/2017    K 3.7 09/26/2017     09/26/2017    CO2 28 09/26/2017    GLU 99 09/26/2017    BUN 15 09/26/2017    CREATININE 0.9 09/26/2017    CALCIUM 9.6 09/26/2017    PROT 7.1 09/26/2017    ALBUMIN 3.3 (L) 09/26/2017    BILITOT 0.3 09/26/2017    ALKPHOS 51 (L) 09/26/2017    AST 12 09/26/2017    ALT 10 09/26/2017    ANIONGAP 6 (L) 09/26/2017    ESTGFRAFRICA >60.0 09/26/2017    EGFRNONAA >60.0 09/26/2017    TSH 2.116 07/10/2019     Vit D, 25-Hydroxy   Date Value Ref Range Status   07/10/2019 69 30 - 96 ng/mL Final     Comment:     Vitamin D deficiency.........<10 ng/mL                              Vitamin D insufficiency......10-29 ng/mL       Vitamin D sufficiency........> or equal to 30 ng/mL  Vitamin D toxicity............>100 ng/mL       Assessment and Plan     1. Postablative hypothyroidism  TSH    Comprehensive metabolic panel   2. Multiple thyroid nodules  US Soft Tissue Head Neck Thyroid   3. Essential hypertension     4. Obesity, Class I, BMI 30.0-34.9 (see actual BMI)  Hemoglobin A1C     Postablative hypothyroidism  -- Labs today.  -- Medication Changes:   CONTINUE: LT4 88mcg daily  -- Clinically " and biochemically euthyroid.  -- Goal is a normal TSH.  -- Avoid exogenous hyperthyroidism as this can accelerate bone loss and increase risk of CV complications.  -- Advised to take LT4 on an empty stomach with water and to wait 30-45 minutes before eating or taking other medications   -- Reviewed usual times of thyroid hormone changes  -- Reviewed that symptoms of hypothyroidism may not correlate with tsh, and a normal TSH is the goal of therapy. Symptoms are not a justification for over treatment.    Multiple thyroid nodules  -- Schedule thyroid US.  -- Denies compressive symptoms.     Hypertension  -- Controlled on current medications.     Obesity, Class I, BMI 30.0-34.9 (see actual BMI)  -- Encouraged dietary and lifestyle modifications.  -- Emphasized weight loss goals.    Follow up in about 1 year (around 9/2/2021).

## 2020-09-02 ENCOUNTER — OFFICE VISIT (OUTPATIENT)
Dept: ENDOCRINOLOGY | Facility: CLINIC | Age: 55
End: 2020-09-02
Payer: COMMERCIAL

## 2020-09-02 VITALS
WEIGHT: 222.69 LBS | HEIGHT: 62 IN | HEART RATE: 76 BPM | DIASTOLIC BLOOD PRESSURE: 80 MMHG | BODY MASS INDEX: 40.98 KG/M2 | SYSTOLIC BLOOD PRESSURE: 142 MMHG

## 2020-09-02 DIAGNOSIS — E04.2 MULTIPLE THYROID NODULES: ICD-10-CM

## 2020-09-02 DIAGNOSIS — E89.0 POSTABLATIVE HYPOTHYROIDISM: Primary | ICD-10-CM

## 2020-09-02 DIAGNOSIS — I10 ESSENTIAL HYPERTENSION: ICD-10-CM

## 2020-09-02 DIAGNOSIS — E66.9 OBESITY, CLASS I, BMI 30.0-34.9 (SEE ACTUAL BMI): ICD-10-CM

## 2020-09-02 PROCEDURE — 3008F BODY MASS INDEX DOCD: CPT | Mod: CPTII,S$GLB,, | Performed by: NURSE PRACTITIONER

## 2020-09-02 PROCEDURE — 3077F SYST BP >= 140 MM HG: CPT | Mod: CPTII,S$GLB,, | Performed by: NURSE PRACTITIONER

## 2020-09-02 PROCEDURE — 99214 PR OFFICE/OUTPT VISIT, EST, LEVL IV, 30-39 MIN: ICD-10-PCS | Mod: S$GLB,,, | Performed by: NURSE PRACTITIONER

## 2020-09-02 PROCEDURE — 99999 PR PBB SHADOW E&M-EST. PATIENT-LVL IV: CPT | Mod: PBBFAC,,, | Performed by: NURSE PRACTITIONER

## 2020-09-02 PROCEDURE — 3077F PR MOST RECENT SYSTOLIC BLOOD PRESSURE >= 140 MM HG: ICD-10-PCS | Mod: CPTII,S$GLB,, | Performed by: NURSE PRACTITIONER

## 2020-09-02 PROCEDURE — 3079F DIAST BP 80-89 MM HG: CPT | Mod: CPTII,S$GLB,, | Performed by: NURSE PRACTITIONER

## 2020-09-02 PROCEDURE — 3008F PR BODY MASS INDEX (BMI) DOCUMENTED: ICD-10-PCS | Mod: CPTII,S$GLB,, | Performed by: NURSE PRACTITIONER

## 2020-09-02 PROCEDURE — 99214 OFFICE O/P EST MOD 30 MIN: CPT | Mod: S$GLB,,, | Performed by: NURSE PRACTITIONER

## 2020-09-02 PROCEDURE — 99999 PR PBB SHADOW E&M-EST. PATIENT-LVL IV: ICD-10-PCS | Mod: PBBFAC,,, | Performed by: NURSE PRACTITIONER

## 2020-09-02 PROCEDURE — 3079F PR MOST RECENT DIASTOLIC BLOOD PRESSURE 80-89 MM HG: ICD-10-PCS | Mod: CPTII,S$GLB,, | Performed by: NURSE PRACTITIONER

## 2020-09-02 NOTE — ASSESSMENT & PLAN NOTE
-- Labs today.  -- Medication Changes:   CONTINUE: LT4 88mcg daily  -- Clinically and biochemically euthyroid.  -- Goal is a normal TSH.  -- Avoid exogenous hyperthyroidism as this can accelerate bone loss and increase risk of CV complications.  -- Advised to take LT4 on an empty stomach with water and to wait 30-45 minutes before eating or taking other medications   -- Reviewed usual times of thyroid hormone changes  -- Reviewed that symptoms of hypothyroidism may not correlate with tsh, and a normal TSH is the goal of therapy. Symptoms are not a justification for over treatment.

## 2020-09-03 ENCOUNTER — PATIENT MESSAGE (OUTPATIENT)
Dept: ENDOCRINOLOGY | Facility: CLINIC | Age: 55
End: 2020-09-03

## 2020-09-03 DIAGNOSIS — E89.0 POSTABLATIVE HYPOTHYROIDISM: ICD-10-CM

## 2020-09-03 RX ORDER — LEVOTHYROXINE SODIUM 88 UG/1
88 TABLET ORAL
Qty: 90 TABLET | Refills: 3 | Status: SHIPPED | OUTPATIENT
Start: 2020-09-03 | End: 2020-11-27 | Stop reason: SDUPTHER

## 2020-10-22 ENCOUNTER — PATIENT MESSAGE (OUTPATIENT)
Dept: ENDOCRINOLOGY | Facility: CLINIC | Age: 55
End: 2020-10-22

## 2020-10-22 ENCOUNTER — HOSPITAL ENCOUNTER (OUTPATIENT)
Dept: ENDOCRINOLOGY | Facility: CLINIC | Age: 55
Discharge: HOME | End: 2020-10-22
Attending: NURSE PRACTITIONER
Payer: COMMERCIAL

## 2020-10-22 DIAGNOSIS — E04.2 MULTIPLE THYROID NODULES: ICD-10-CM

## 2020-10-22 PROCEDURE — 76536 US SOFT TISSUE HEAD NECK THYROID: ICD-10-PCS | Mod: S$GLB,,, | Performed by: INTERNAL MEDICINE

## 2020-10-22 PROCEDURE — 76536 US EXAM OF HEAD AND NECK: CPT | Mod: S$GLB,,, | Performed by: INTERNAL MEDICINE

## 2020-11-27 ENCOUNTER — PATIENT MESSAGE (OUTPATIENT)
Dept: ENDOCRINOLOGY | Facility: CLINIC | Age: 55
End: 2020-11-27

## 2020-11-27 DIAGNOSIS — E89.0 POSTABLATIVE HYPOTHYROIDISM: ICD-10-CM

## 2020-11-27 RX ORDER — LEVOTHYROXINE SODIUM 88 UG/1
88 TABLET ORAL
Qty: 90 TABLET | Refills: 3 | Status: SHIPPED | OUTPATIENT
Start: 2020-11-27 | End: 2021-07-19 | Stop reason: SDUPTHER

## 2021-07-19 ENCOUNTER — OFFICE VISIT (OUTPATIENT)
Dept: ENDOCRINOLOGY | Facility: CLINIC | Age: 56
End: 2021-07-19
Payer: COMMERCIAL

## 2021-07-19 VITALS
HEIGHT: 67 IN | SYSTOLIC BLOOD PRESSURE: 133 MMHG | BODY MASS INDEX: 38.18 KG/M2 | DIASTOLIC BLOOD PRESSURE: 92 MMHG | WEIGHT: 243.25 LBS

## 2021-07-19 DIAGNOSIS — R73.03 PREDIABETES: ICD-10-CM

## 2021-07-19 DIAGNOSIS — E04.2 MULTIPLE THYROID NODULES: Primary | ICD-10-CM

## 2021-07-19 DIAGNOSIS — E66.9 OBESITY, CLASS I, BMI 30.0-34.9 (SEE ACTUAL BMI): ICD-10-CM

## 2021-07-19 DIAGNOSIS — E89.0 POSTABLATIVE HYPOTHYROIDISM: ICD-10-CM

## 2021-07-19 DIAGNOSIS — E55.9 VITAMIN D DEFICIENCY: ICD-10-CM

## 2021-07-19 PROCEDURE — 3008F BODY MASS INDEX DOCD: CPT | Mod: CPTII,S$GLB,, | Performed by: NURSE PRACTITIONER

## 2021-07-19 PROCEDURE — 99214 OFFICE O/P EST MOD 30 MIN: CPT | Mod: S$GLB,,, | Performed by: NURSE PRACTITIONER

## 2021-07-19 PROCEDURE — 99999 PR PBB SHADOW E&M-EST. PATIENT-LVL III: ICD-10-PCS | Mod: PBBFAC,,, | Performed by: NURSE PRACTITIONER

## 2021-07-19 PROCEDURE — 1126F AMNT PAIN NOTED NONE PRSNT: CPT | Mod: CPTII,S$GLB,, | Performed by: NURSE PRACTITIONER

## 2021-07-19 PROCEDURE — 99214 PR OFFICE/OUTPT VISIT, EST, LEVL IV, 30-39 MIN: ICD-10-PCS | Mod: S$GLB,,, | Performed by: NURSE PRACTITIONER

## 2021-07-19 PROCEDURE — 99999 PR PBB SHADOW E&M-EST. PATIENT-LVL III: CPT | Mod: PBBFAC,,, | Performed by: NURSE PRACTITIONER

## 2021-07-19 PROCEDURE — 3008F PR BODY MASS INDEX (BMI) DOCUMENTED: ICD-10-PCS | Mod: CPTII,S$GLB,, | Performed by: NURSE PRACTITIONER

## 2021-07-19 PROCEDURE — 1126F PR PAIN SEVERITY QUANTIFIED, NO PAIN PRESENT: ICD-10-PCS | Mod: CPTII,S$GLB,, | Performed by: NURSE PRACTITIONER

## 2021-07-19 RX ORDER — LEVOTHYROXINE SODIUM 88 UG/1
88 TABLET ORAL
Qty: 90 TABLET | Refills: 3 | Status: SHIPPED | OUTPATIENT
Start: 2021-07-19 | End: 2021-07-23 | Stop reason: SDUPTHER

## 2021-07-22 ENCOUNTER — LAB VISIT (OUTPATIENT)
Dept: LAB | Facility: HOSPITAL | Age: 56
End: 2021-07-22
Payer: COMMERCIAL

## 2021-07-22 DIAGNOSIS — E55.9 VITAMIN D DEFICIENCY: ICD-10-CM

## 2021-07-22 DIAGNOSIS — E66.9 OBESITY, CLASS I, BMI 30.0-34.9 (SEE ACTUAL BMI): ICD-10-CM

## 2021-07-22 DIAGNOSIS — E89.0 POSTABLATIVE HYPOTHYROIDISM: ICD-10-CM

## 2021-07-22 LAB
ALBUMIN SERPL BCP-MCNC: 3.7 G/DL (ref 3.5–5.2)
ALP SERPL-CCNC: 48 U/L (ref 55–135)
ALT SERPL W/O P-5'-P-CCNC: 18 U/L (ref 10–44)
ANION GAP SERPL CALC-SCNC: 12 MMOL/L (ref 8–16)
AST SERPL-CCNC: 23 U/L (ref 10–40)
BILIRUB SERPL-MCNC: 0.5 MG/DL (ref 0.1–1)
BUN SERPL-MCNC: 11 MG/DL (ref 6–20)
CALCIUM SERPL-MCNC: 8.9 MG/DL (ref 8.7–10.5)
CHLORIDE SERPL-SCNC: 101 MMOL/L (ref 95–110)
CHOLEST SERPL-MCNC: 200 MG/DL (ref 120–199)
CHOLEST/HDLC SERPL: 2.9 {RATIO} (ref 2–5)
CO2 SERPL-SCNC: 23 MMOL/L (ref 23–29)
CREAT SERPL-MCNC: 0.8 MG/DL (ref 0.5–1.4)
EST. GFR  (AFRICAN AMERICAN): >60 ML/MIN/1.73 M^2
EST. GFR  (NON AFRICAN AMERICAN): >60 ML/MIN/1.73 M^2
GLUCOSE SERPL-MCNC: 86 MG/DL (ref 70–110)
HDLC SERPL-MCNC: 68 MG/DL (ref 40–75)
HDLC SERPL: 34 % (ref 20–50)
LDLC SERPL CALC-MCNC: 113.4 MG/DL (ref 63–159)
NONHDLC SERPL-MCNC: 132 MG/DL
POTASSIUM SERPL-SCNC: 3.2 MMOL/L (ref 3.5–5.1)
PROT SERPL-MCNC: 7.1 G/DL (ref 6–8.4)
SODIUM SERPL-SCNC: 136 MMOL/L (ref 136–145)
TRIGL SERPL-MCNC: 93 MG/DL (ref 30–150)
TSH SERPL DL<=0.005 MIU/L-ACNC: 3.44 UIU/ML (ref 0.4–4)

## 2021-07-22 PROCEDURE — 84443 ASSAY THYROID STIM HORMONE: CPT | Performed by: NURSE PRACTITIONER

## 2021-07-22 PROCEDURE — 83036 HEMOGLOBIN GLYCOSYLATED A1C: CPT | Performed by: NURSE PRACTITIONER

## 2021-07-22 PROCEDURE — 36415 COLL VENOUS BLD VENIPUNCTURE: CPT | Mod: PO | Performed by: NURSE PRACTITIONER

## 2021-07-22 PROCEDURE — 80061 LIPID PANEL: CPT | Performed by: NURSE PRACTITIONER

## 2021-07-22 PROCEDURE — 80053 COMPREHEN METABOLIC PANEL: CPT | Performed by: NURSE PRACTITIONER

## 2021-07-22 PROCEDURE — 82306 VITAMIN D 25 HYDROXY: CPT | Performed by: NURSE PRACTITIONER

## 2021-07-23 ENCOUNTER — PATIENT MESSAGE (OUTPATIENT)
Dept: ENDOCRINOLOGY | Facility: CLINIC | Age: 56
End: 2021-07-23

## 2021-07-23 DIAGNOSIS — E89.0 POSTABLATIVE HYPOTHYROIDISM: Primary | ICD-10-CM

## 2021-07-23 DIAGNOSIS — E89.0 POSTABLATIVE HYPOTHYROIDISM: ICD-10-CM

## 2021-07-23 LAB
25(OH)D3+25(OH)D2 SERPL-MCNC: 42 NG/ML (ref 30–96)
ESTIMATED AVG GLUCOSE: 114 MG/DL (ref 68–131)
HBA1C MFR BLD: 5.6 % (ref 4–5.6)

## 2021-07-23 RX ORDER — LEVOTHYROXINE SODIUM 88 UG/1
88 TABLET ORAL
Qty: 96 TABLET | Refills: 3 | Status: SHIPPED | OUTPATIENT
Start: 2021-07-23 | End: 2022-07-25 | Stop reason: SDUPTHER

## 2021-08-25 ENCOUNTER — LAB VISIT (OUTPATIENT)
Dept: LAB | Facility: HOSPITAL | Age: 56
End: 2021-08-25
Attending: NURSE PRACTITIONER
Payer: COMMERCIAL

## 2021-08-25 DIAGNOSIS — E89.0 POSTABLATIVE HYPOTHYROIDISM: ICD-10-CM

## 2021-08-25 LAB — TSH SERPL DL<=0.005 MIU/L-ACNC: 2.02 UIU/ML (ref 0.4–4)

## 2021-08-25 PROCEDURE — 36415 COLL VENOUS BLD VENIPUNCTURE: CPT | Mod: PO | Performed by: NURSE PRACTITIONER

## 2021-08-25 PROCEDURE — 84443 ASSAY THYROID STIM HORMONE: CPT | Performed by: NURSE PRACTITIONER

## 2021-08-26 ENCOUNTER — PATIENT MESSAGE (OUTPATIENT)
Dept: ENDOCRINOLOGY | Facility: CLINIC | Age: 56
End: 2021-08-26

## 2022-02-04 ENCOUNTER — PATIENT MESSAGE (OUTPATIENT)
Dept: ENDOCRINOLOGY | Facility: CLINIC | Age: 57
End: 2022-02-04

## 2022-02-04 ENCOUNTER — OFFICE VISIT (OUTPATIENT)
Dept: ENDOCRINOLOGY | Facility: CLINIC | Age: 57
End: 2022-02-04
Payer: COMMERCIAL

## 2022-02-04 ENCOUNTER — LAB VISIT (OUTPATIENT)
Dept: LAB | Facility: HOSPITAL | Age: 57
End: 2022-02-04
Payer: COMMERCIAL

## 2022-02-04 VITALS
OXYGEN SATURATION: 98 % | DIASTOLIC BLOOD PRESSURE: 78 MMHG | WEIGHT: 246.56 LBS | RESPIRATION RATE: 16 BRPM | SYSTOLIC BLOOD PRESSURE: 128 MMHG | BODY MASS INDEX: 38.7 KG/M2 | HEIGHT: 67 IN | HEART RATE: 83 BPM

## 2022-02-04 DIAGNOSIS — E89.0 POSTABLATIVE HYPOTHYROIDISM: ICD-10-CM

## 2022-02-04 DIAGNOSIS — R73.03 PREDIABETES: ICD-10-CM

## 2022-02-04 DIAGNOSIS — E89.0 POSTABLATIVE HYPOTHYROIDISM: Primary | ICD-10-CM

## 2022-02-04 DIAGNOSIS — E04.2 MULTIPLE THYROID NODULES: ICD-10-CM

## 2022-02-04 LAB — TSH SERPL DL<=0.005 MIU/L-ACNC: 3.39 UIU/ML (ref 0.4–4)

## 2022-02-04 PROCEDURE — 99214 OFFICE O/P EST MOD 30 MIN: CPT | Mod: S$GLB,,, | Performed by: GENERAL ACUTE CARE HOSPITAL

## 2022-02-04 PROCEDURE — 36415 COLL VENOUS BLD VENIPUNCTURE: CPT | Performed by: GENERAL ACUTE CARE HOSPITAL

## 2022-02-04 PROCEDURE — 84443 ASSAY THYROID STIM HORMONE: CPT | Performed by: GENERAL ACUTE CARE HOSPITAL

## 2022-02-04 PROCEDURE — 99999 PR PBB SHADOW E&M-EST. PATIENT-LVL V: CPT | Mod: PBBFAC,,, | Performed by: GENERAL ACUTE CARE HOSPITAL

## 2022-02-04 PROCEDURE — 99999 PR PBB SHADOW E&M-EST. PATIENT-LVL V: ICD-10-PCS | Mod: PBBFAC,,, | Performed by: GENERAL ACUTE CARE HOSPITAL

## 2022-02-04 PROCEDURE — 99214 PR OFFICE/OUTPT VISIT, EST, LEVL IV, 30-39 MIN: ICD-10-PCS | Mod: S$GLB,,, | Performed by: GENERAL ACUTE CARE HOSPITAL

## 2022-02-04 RX ORDER — LORATADINE 10 MG/1
10 TABLET ORAL DAILY
COMMUNITY
Start: 2022-01-20

## 2022-02-04 NOTE — PATIENT INSTRUCTIONS
RTC in 1 year   Thyroid US to be done prior to next visit  TSH today    Referral to bariatric medicine given given

## 2022-02-04 NOTE — ASSESSMENT & PLAN NOTE
Thyroid US stable in 2020  -- Denies compressive symptoms.   Check thyroid US prior to next visit

## 2022-02-04 NOTE — ASSESSMENT & PLAN NOTE
-- Labs today.  -- Medication Changes:   CONTINUE: LT4 88mcg daily and an extra half tablet on sundays for a total of 7.5 tablets a week  -- Goal is a normal TSH.  -- Avoid exogenous hyperthyroidism as this can accelerate bone loss and increase risk of CV complications.  -- Advised to take LT4 on an empty stomach with water and to wait 30-45 minutes before eating or taking other medications   -- Reviewed usual times of thyroid hormone changes  -- Reviewed that symptoms of hypothyroidism may not correlate with tsh, and a normal TSH is the goal of therapy. Symptoms are not a justification for over treatment.

## 2022-02-04 NOTE — PROGRESS NOTES
I have reviewed and concur with Dr. Sotomayor's history, physical, assessment, and plan.  I have personally interviewed and examined the patient.      Kasia Meyers MD

## 2022-02-04 NOTE — PROGRESS NOTES
Subjective:      Patient ID: Jaimie Goldberg is a 57 y.o. female.    Chief Complaint:  Hypothyroidism and Abnormal Lab    History of Present Illness  Jaimie Goldberg is here for follow up of hypothyroidism and thyroid nodule.      Previously seen by ISAIAS Donnelly on 7/19/2021    With regards to hypothyroidism:  Pt with post ablation hypothyroidism due to toxic adenoma (11/16/2017 received 34.0 mCi of I-131 orally)    NM thyroid uptake and scan performed on 10/27/2017: 2 hyperfunctioning nodules on the right. Uptake 17.5% (Consistent with hot nodules)        Lab Results   Component Value Date    TSH 2.019 08/25/2021    FREET4 0.97 04/12/2018     Current Medication:  LT4 88mcg daily and an extra half tablet on Sundays for a total of 7.5 tablets a week  Denies missing doses    Takes thyroid medication properly without food first thing in the morning.    Current Symptoms:   + Weight gain (for the past 3 months 15 pounds, not physically active, fairly well diet)  - Fatigue (But does report feeling SOB when feeding the dog  - Constipation   - Hair loss  - Brittle nails  - Mental fog   - cp, palpations or sob  - Heat intolerance  - Cold intolerance    BMD 10/26/2017  Normal BMD of lumbar spine and hip  Recommendations:  1) Adequate calcium and Vitamin D therapy  2) Appropriate exercise  3) Consider repeat BMD at age 65    With regards to thyroid nodule:    Thyroid US: 10/2020  THYROID GLAND is small with a diffusely heterogenous echotexture, consistent with clinical history of I-131 therapy.  Vascularity is normal.     Right lobe measures: 3.80 x 0.71 x 0.82 cm.   1. 0.91 x 0.70 x 0.58 cm right superior lobe nodule. The nodule is solid with hypoechoic echotexture. Vascularity is Grade 1 (absent). Borders are well-defined. Microcalcifications are not present. On the previous ultrasound this nodule measured 0.96 x 0.80 x 0.87 cm.  Compared to the previous ultrasound, the nodule is slightly smaller.  2.  0.58 x 0.41 x 0.42 cm  right inferior lobe nodule. The nodule is solid with isoechoic echotexture. Vascularity is Grade 2 (peripheral). Borders are well-defined. Microcalcifications are not present. On the previous ultrasound this nodule measured 0.94 x 0.71 x 0.58 cm.  Compared to the previous ultrasound, the nodule is smaller in size.    Isthmus measures: 0.2 cm in AP dimension.     Left lobe measures: 3.34 x 0.54 x 0.97 cm.   1. No distinct nodules are identified.     LYMPH NODES:   Real-time survey of the bilateral central and lateral neck was performed.  It did not reveal any abnormal appearing lymph nodes.     Impression:   1.  Thyroid gland is small with a diffusely heterogenous echotexture, consistent with clinical history of I-131 therapy.  Vascularity is normal.   2.  Two nodules in the right thyroid described above.  None meet criteria for fine-needle aspiration.   Consider follow-up ultrasound in 2-3 years   FNA: None    Signs or Symptoms:   Difficulty breathing: Denies  Difficulty swallowing: Denies  Voice Changes: Denies  FH of thyroid cancer: Denies  Personal history of radiation treatment or exposure: Denies    With regards to weight gain,     Family history of diabetes in mother and sister, maternal uncle    She is concerned of weight gain today.   States she has been at home most of the time with her job - in process of waiting to see if she will be rehired.    She has gained 15 pounds in last month  States most of her meals are baked or air fried.     Snacking on granola bars, chips and sometimes crackers  She is drinking powerade and water.   She is not exercising    Lab Results   Component Value Date    HGBA1C 5.6 07/22/2021       With regards to vitamin d deficiency,     She is on centrum silver   Results for ALBERTA VENTURA (MRN [id number]) as of 7/19/2021 14:08   Ref. Range 7/10/2019 13:04   Vit D, 25-Hydroxy Latest Ref Range: 30 - 96 ng/mL 69       Review of Systems   7 point review of systems negative except as stated  "above    Objective:   Physical Exam  Vitals reviewed.   Neck:      Thyroid: No thyromegaly (possible thyroid nodule palpable on right).   Cardiovascular:      Comments: No edema present  Pulmonary:      Effort: Pulmonary effort is normal.   Abdominal:      Palpations: Abdomen is soft.       Visit Vitals  /78 (BP Location: Right arm, Patient Position: Sitting, BP Method: Large (Manual))   Pulse 83   Resp 16   Ht 5' 7" (1.702 m)   Wt 111.8 kg (246 lb 9.4 oz)   SpO2 98%   BMI 38.62 kg/m²       Body mass index is 38.62 kg/m².    Lab Review:   Lab Results   Component Value Date    HGBA1C 5.6 07/22/2021    HGBA1C 5.8 (H) 09/02/2020    HGBA1C 5.4 12/15/2017       Lab Results   Component Value Date    CHOL 200 (H) 07/22/2021    HDL 68 07/22/2021    LDLCALC 113.4 07/22/2021    TRIG 93 07/22/2021    CHOLHDL 34.0 07/22/2021     Lab Results   Component Value Date     07/22/2021    K 3.2 (L) 07/22/2021     07/22/2021    CO2 23 07/22/2021    GLU 86 07/22/2021    BUN 11 07/22/2021    CREATININE 0.8 07/22/2021    CALCIUM 8.9 07/22/2021    PROT 7.1 07/22/2021    ALBUMIN 3.7 07/22/2021    BILITOT 0.5 07/22/2021    ALKPHOS 48 (L) 07/22/2021    AST 23 07/22/2021    ALT 18 07/22/2021    ANIONGAP 12 07/22/2021    ESTGFRAFRICA >60.0 07/22/2021    EGFRNONAA >60.0 07/22/2021    TSH 2.019 08/25/2021     Vit D, 25-Hydroxy   Date Value Ref Range Status   07/22/2021 42 30 - 96 ng/mL Final     Comment:     Vitamin D deficiency.........<10 ng/mL                              Vitamin D insufficiency......10-29 ng/mL       Vitamin D sufficiency........> or equal to 30 ng/mL  Vitamin D toxicity............>100 ng/mL       Assessment and Plan     1. Postablative hypothyroidism  TSH   2. Multiple thyroid nodules  US Thyroid   3. BMI 38.0-38.9,adult  Ambulatory referral/consult to Bariatric Surgery   4. Prediabetes       Multiple thyroid nodules  Thyroid US stable in 2020  -- Denies compressive symptoms.   Check thyroid US prior to " next visit      Postablative hypothyroidism  -- Labs today.  -- Medication Changes:   CONTINUE: LT4 88mcg daily and an extra half tablet on sundays for a total of 7.5 tablets a week  -- Goal is a normal TSH.  -- Avoid exogenous hyperthyroidism as this can accelerate bone loss and increase risk of CV complications.  -- Advised to take LT4 on an empty stomach with water and to wait 30-45 minutes before eating or taking other medications   -- Reviewed usual times of thyroid hormone changes  -- Reviewed that symptoms of hypothyroidism may not correlate with tsh, and a normal TSH is the goal of therapy. Symptoms are not a justification for over treatment.      BMI 38.0-38.9,adult  -- Diet and exercise discussed      Prediabetes  -- Resolved  -- A1c can be checked by PCP on yearly physical        Follow up in about 1 year (around 2/4/2023).

## 2022-02-18 ENCOUNTER — PATIENT MESSAGE (OUTPATIENT)
Dept: ENDOCRINOLOGY | Facility: CLINIC | Age: 57
End: 2022-02-18
Payer: COMMERCIAL

## 2022-02-23 DIAGNOSIS — D84.9 IMMUNOSUPPRESSED STATUS: ICD-10-CM

## 2022-07-25 DIAGNOSIS — E89.0 POSTABLATIVE HYPOTHYROIDISM: ICD-10-CM

## 2022-07-26 RX ORDER — LEVOTHYROXINE SODIUM 88 UG/1
TABLET ORAL
Qty: 96 TABLET | Refills: 1 | Status: SHIPPED | OUTPATIENT
Start: 2022-07-26 | End: 2022-12-22 | Stop reason: SDUPTHER

## 2022-10-19 ENCOUNTER — TELEPHONE (OUTPATIENT)
Dept: ENDOCRINOLOGY | Facility: CLINIC | Age: 57
End: 2022-10-19
Payer: COMMERCIAL

## 2022-10-19 NOTE — TELEPHONE ENCOUNTER
----- Message from Massiel Fry sent at 10/19/2022  3:43 PM CDT -----  Contact: pt  Pt requesting call back RE: would like to schedule with dec, nothing available epic        Confirmed contact below:  Contact Name:Jaimie Goldberg  Phone Number: [phone]

## 2022-12-22 ENCOUNTER — LAB VISIT (OUTPATIENT)
Dept: LAB | Facility: HOSPITAL | Age: 57
End: 2022-12-22
Attending: INTERNAL MEDICINE
Payer: COMMERCIAL

## 2022-12-22 ENCOUNTER — OFFICE VISIT (OUTPATIENT)
Dept: ENDOCRINOLOGY | Facility: CLINIC | Age: 57
End: 2022-12-22
Payer: COMMERCIAL

## 2022-12-22 VITALS
WEIGHT: 244.25 LBS | BODY MASS INDEX: 38.34 KG/M2 | HEART RATE: 93 BPM | OXYGEN SATURATION: 95 % | RESPIRATION RATE: 18 BRPM | TEMPERATURE: 98 F | HEIGHT: 67 IN | SYSTOLIC BLOOD PRESSURE: 129 MMHG | DIASTOLIC BLOOD PRESSURE: 72 MMHG

## 2022-12-22 DIAGNOSIS — E89.0 POSTABLATIVE HYPOTHYROIDISM: Primary | ICD-10-CM

## 2022-12-22 DIAGNOSIS — R73.03 PREDIABETES: ICD-10-CM

## 2022-12-22 DIAGNOSIS — E04.2 MULTIPLE THYROID NODULES: ICD-10-CM

## 2022-12-22 DIAGNOSIS — E89.0 POSTABLATIVE HYPOTHYROIDISM: ICD-10-CM

## 2022-12-22 DIAGNOSIS — E66.01 CLASS 2 SEVERE OBESITY WITH SERIOUS COMORBIDITY AND BODY MASS INDEX (BMI) OF 38.0 TO 38.9 IN ADULT, UNSPECIFIED OBESITY TYPE: ICD-10-CM

## 2022-12-22 PROBLEM — E66.812 CLASS 2 SEVERE OBESITY WITH SERIOUS COMORBIDITY AND BODY MASS INDEX (BMI) OF 38.0 TO 38.9 IN ADULT: Status: ACTIVE | Noted: 2018-07-12

## 2022-12-22 LAB
ESTIMATED AVG GLUCOSE: 117 MG/DL (ref 68–131)
HBA1C MFR BLD: 5.7 % (ref 4–5.6)

## 2022-12-22 PROCEDURE — 3078F DIAST BP <80 MM HG: CPT | Mod: CPTII,S$GLB,, | Performed by: INTERNAL MEDICINE

## 2022-12-22 PROCEDURE — 99214 PR OFFICE/OUTPT VISIT, EST, LEVL IV, 30-39 MIN: ICD-10-PCS | Mod: S$GLB,,, | Performed by: INTERNAL MEDICINE

## 2022-12-22 PROCEDURE — 84443 ASSAY THYROID STIM HORMONE: CPT | Performed by: INTERNAL MEDICINE

## 2022-12-22 PROCEDURE — 1159F MED LIST DOCD IN RCRD: CPT | Mod: CPTII,S$GLB,, | Performed by: INTERNAL MEDICINE

## 2022-12-22 PROCEDURE — 99999 PR PBB SHADOW E&M-EST. PATIENT-LVL IV: ICD-10-PCS | Mod: PBBFAC,,, | Performed by: INTERNAL MEDICINE

## 2022-12-22 PROCEDURE — 3008F PR BODY MASS INDEX (BMI) DOCUMENTED: ICD-10-PCS | Mod: CPTII,S$GLB,, | Performed by: INTERNAL MEDICINE

## 2022-12-22 PROCEDURE — 1160F PR REVIEW ALL MEDS BY PRESCRIBER/CLIN PHARMACIST DOCUMENTED: ICD-10-PCS | Mod: CPTII,S$GLB,, | Performed by: INTERNAL MEDICINE

## 2022-12-22 PROCEDURE — 4010F ACE/ARB THERAPY RXD/TAKEN: CPT | Mod: CPTII,S$GLB,, | Performed by: INTERNAL MEDICINE

## 2022-12-22 PROCEDURE — 3074F SYST BP LT 130 MM HG: CPT | Mod: CPTII,S$GLB,, | Performed by: INTERNAL MEDICINE

## 2022-12-22 PROCEDURE — 3008F BODY MASS INDEX DOCD: CPT | Mod: CPTII,S$GLB,, | Performed by: INTERNAL MEDICINE

## 2022-12-22 PROCEDURE — 3074F PR MOST RECENT SYSTOLIC BLOOD PRESSURE < 130 MM HG: ICD-10-PCS | Mod: CPTII,S$GLB,, | Performed by: INTERNAL MEDICINE

## 2022-12-22 PROCEDURE — 1159F PR MEDICATION LIST DOCUMENTED IN MEDICAL RECORD: ICD-10-PCS | Mod: CPTII,S$GLB,, | Performed by: INTERNAL MEDICINE

## 2022-12-22 PROCEDURE — 4010F PR ACE/ARB THEARPY RXD/TAKEN: ICD-10-PCS | Mod: CPTII,S$GLB,, | Performed by: INTERNAL MEDICINE

## 2022-12-22 PROCEDURE — 36415 COLL VENOUS BLD VENIPUNCTURE: CPT | Mod: PO | Performed by: INTERNAL MEDICINE

## 2022-12-22 PROCEDURE — 83036 HEMOGLOBIN GLYCOSYLATED A1C: CPT | Performed by: INTERNAL MEDICINE

## 2022-12-22 PROCEDURE — 1160F RVW MEDS BY RX/DR IN RCRD: CPT | Mod: CPTII,S$GLB,, | Performed by: INTERNAL MEDICINE

## 2022-12-22 PROCEDURE — 3078F PR MOST RECENT DIASTOLIC BLOOD PRESSURE < 80 MM HG: ICD-10-PCS | Mod: CPTII,S$GLB,, | Performed by: INTERNAL MEDICINE

## 2022-12-22 PROCEDURE — 99214 OFFICE O/P EST MOD 30 MIN: CPT | Mod: S$GLB,,, | Performed by: INTERNAL MEDICINE

## 2022-12-22 PROCEDURE — 99999 PR PBB SHADOW E&M-EST. PATIENT-LVL IV: CPT | Mod: PBBFAC,,, | Performed by: INTERNAL MEDICINE

## 2022-12-22 RX ORDER — LEVOTHYROXINE SODIUM 88 UG/1
TABLET ORAL
Qty: 96 TABLET | Refills: 3 | Status: SHIPPED | OUTPATIENT
Start: 2022-12-22 | End: 2023-05-09 | Stop reason: SDUPTHER

## 2022-12-22 RX ORDER — PHENTERMINE HYDROCHLORIDE 37.5 MG/1
18.75 TABLET ORAL
Qty: 30 TABLET | Refills: 2 | Status: SHIPPED | OUTPATIENT
Start: 2022-12-22 | End: 2023-05-09

## 2022-12-22 RX ORDER — ESTRADIOL 1 MG/1
1 TABLET ORAL
COMMUNITY
Start: 2022-12-06

## 2022-12-22 RX ORDER — TOPIRAMATE 50 MG/1
50 TABLET, FILM COATED ORAL 2 TIMES DAILY
Qty: 60 TABLET | Refills: 5 | Status: SHIPPED | OUTPATIENT
Start: 2022-12-22 | End: 2023-05-09

## 2022-12-22 NOTE — ASSESSMENT & PLAN NOTE
Body mass index is 38.26 kg/m².   complicated by HTN, HLD, prediabetes   encourage pt to work on healthy diet, increase exercise as tolerated. Reviewed weight watchers, other systems   pt interested in medications. Reviewed options, details in AVS   try for phentermine + topiramate. Reviewed symptoms to watch out for.   monitor weight

## 2022-12-22 NOTE — PROGRESS NOTES
Subjective:      Chief Complaint: Hypothyroidism (Pt states that she is here for her fu )      HPI: Jaimie Goldberg is a 57 y.o. female who is here for a follow-up evaluation for thyroid. Last seen 2/2022, though this is her first visit with me.    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 S/p 34 mCi radioactive iodine 11/16/2017, now with post-ablative hypothyroidism.    Current medication:  levothyroxine  Current dose: 88 mcg daily, PLUS 44 mcg on Sundays.    Lab Results   Component Value Date    TSH 3.389 02/04/2022    FREET4 0.97 04/12/2018     For nodules:   Last US 10/22/2020. [facility].  . 0.91 x 0.70 x 0.58 cm right superior lobe nodule. The nodule is solid with hypoechoic echotexture. Vascularity is Grade 1 (absent). Borders are well-defined. Microcalcifications are not present. On the previous ultrasound this nodule measured 0.96 x 0.80 x 0.87 cm.  Compared to the previous ultrasound, the nodule is slightly smaller.  2.  0.58 x 0.41 x 0.42 cm right inferior lobe nodule. The nodule is solid with isoechoic echotexture. Vascularity is Grade 2 (peripheral). Borders are well-defined. Microcalcifications are not present. On the previous ultrasound this nodule measured 0.94 x 0.71 x 0.58 cm.  Compared to the previous ultrasound, the nodule is smaller in size.    Symptoms  No   Yes  []    [x]  Weight gain over the last few years. Fairly steady lately.  []    [x]  Fatigue  [x]    []  Constipation  [x]    []  Cold intolerance    [x]    []  Weight loss  [x]    []  Insomnia  [x]    []  Diarrhea  [x]    []  Heat intolerance  [x]    []  Palpitations      Reviewed past medical, family, social history and updated as appropriate.    Review of Systems  As above    Objective:     Vitals:    12/22/22 1546   BP: 129/72   Pulse: 93   Resp: 18   Temp: 97.8 °F (36.6 °C)     BP Readings from Last 5 Encounters:    12/22/22 129/72   02/04/22 128/78   07/19/21 (!) 133/92   09/02/20 (!) 142/80   07/16/19 129/84       Physical Exam  Vitals reviewed.   Constitutional:       General: She is not in acute distress.     Appearance: She is obese.   Cardiovascular:      Heart sounds: Normal heart sounds.   Pulmonary:      Effort: Pulmonary effort is normal.       Wt Readings from Last 10 Encounters:   12/22/22 1546 110.8 kg (244 lb 4.3 oz)   02/04/22 1316 111.8 kg (246 lb 9.4 oz)   07/19/21 1354 110.3 kg (243 lb 4.4 oz)   09/02/20 1401 101 kg (222 lb 10.6 oz)   07/16/19 1339 100.8 kg (222 lb 1.8 oz)   07/12/18 1515 99.3 kg (218 lb 14.7 oz)   01/02/18 1446 97.5 kg (214 lb 15.2 oz)   09/26/17 1547 95.9 kg (211 lb 6.7 oz)       Lab Results   Component Value Date    HGBA1C 5.6 07/22/2021     Lab Results   Component Value Date    CHOL 200 (H) 07/22/2021    HDL 68 07/22/2021    LDLCALC 113.4 07/22/2021    TRIG 93 07/22/2021    CHOLHDL 34.0 07/22/2021     Lab Results   Component Value Date     07/22/2021    K 3.2 (L) 07/22/2021     07/22/2021    CO2 23 07/22/2021    GLU 86 07/22/2021    BUN 11 07/22/2021    CREATININE 0.8 07/22/2021    CALCIUM 8.9 07/22/2021    PROT 7.1 07/22/2021    ALBUMIN 3.7 07/22/2021    BILITOT 0.5 07/22/2021    ALKPHOS 48 (L) 07/22/2021    AST 23 07/22/2021    ALT 18 07/22/2021    ANIONGAP 12 07/22/2021    ESTGFRAFRICA >60.0 07/22/2021    EGFRNONAA >60.0 07/22/2021    TSH 3.389 02/04/2022        Assessment/Plan:     Postablative hypothyroidism  Last labs normal   clinically some fatigue otherwise denies most thyroid related symptoms   continue levothyroxine 88 mcg/day, extra 44 mcg on Sundays.   repeat labs today. Adjust dose as needed based on results. Goal is a normal TSH    Prediabetes  Improved on last labs   recheck with next labs   consider GLP1 if full diabetes    Multiple thyroid nodules  Small nodules in 2020, stable.   no compressive symptoms  Recheck US next year      Class 2 severe obesity with  serious comorbidity and body mass index (BMI) of 38.0 to 38.9 in adult  Body mass index is 38.26 kg/m².   complicated by HTN, HLD, prediabetes   encourage pt to work on healthy diet, increase exercise as tolerated. Reviewed weight watchers, other systems   pt interested in medications. Reviewed options, details in AVS   try for phentermine + topiramate. Reviewed symptoms to watch out for.   monitor weight      Follow up in about 1 year (around 12/22/2023) for further monitoring, lab review, imaging review.      Medhat Krishnamurthy MD  Endocrinology

## 2022-12-22 NOTE — PATIENT INSTRUCTIONS
Think about diet.    Calorie tracking systems like myfitness pal, or weight watchers.   Aiming for 1500 bud/day or less is usually okay to get some weight loss going.    For weight, if interested in medications, options include:    Phentermine (with topiramate). Pill once a day. Appetite suppressant. Has good coupon on [employer] if not covered by insurance.    Other options:   Contrave. Sometimes mood side effects, more often expensive.   Orlistat. Bad GI side effects    Injection medications.   Wegovy. Once a week shot   Saxenda. Once a day shot.    Off-label use of things like Trulicity, ozempic, mounjaro.

## 2022-12-22 NOTE — ASSESSMENT & PLAN NOTE
Last labs normal   clinically some fatigue otherwise denies most thyroid related symptoms   continue levothyroxine 88 mcg/day, extra 44 mcg on Sundays.   repeat labs today. Adjust dose as needed based on results. Goal is a normal TSH

## 2022-12-23 ENCOUNTER — PATIENT MESSAGE (OUTPATIENT)
Dept: ENDOCRINOLOGY | Facility: CLINIC | Age: 57
End: 2022-12-23
Payer: COMMERCIAL

## 2022-12-23 LAB — TSH SERPL DL<=0.005 MIU/L-ACNC: 2.93 UIU/ML (ref 0.4–4)

## 2023-05-01 ENCOUNTER — HOSPITAL ENCOUNTER (OUTPATIENT)
Dept: RADIOLOGY | Facility: HOSPITAL | Age: 58
Discharge: HOME | End: 2023-05-01
Attending: INTERNAL MEDICINE
Payer: COMMERCIAL

## 2023-05-01 DIAGNOSIS — E89.0 POSTABLATIVE HYPOTHYROIDISM: ICD-10-CM

## 2023-05-01 PROCEDURE — 76536 US EXAM OF HEAD AND NECK: CPT | Mod: TC

## 2023-05-01 PROCEDURE — 76536 US SOFT TISSUE HEAD NECK THYROID: ICD-10-PCS | Mod: 26,,, | Performed by: RADIOLOGY

## 2023-05-01 PROCEDURE — 76536 US EXAM OF HEAD AND NECK: CPT | Mod: 26,,, | Performed by: RADIOLOGY

## 2023-05-09 ENCOUNTER — OFFICE VISIT (OUTPATIENT)
Dept: ENDOCRINOLOGY | Facility: CLINIC | Age: 58
End: 2023-05-09
Payer: COMMERCIAL

## 2023-05-09 VITALS
DIASTOLIC BLOOD PRESSURE: 80 MMHG | SYSTOLIC BLOOD PRESSURE: 118 MMHG | HEIGHT: 67 IN | OXYGEN SATURATION: 97 % | WEIGHT: 245.56 LBS | BODY MASS INDEX: 38.54 KG/M2 | TEMPERATURE: 98 F | HEART RATE: 87 BPM

## 2023-05-09 DIAGNOSIS — E89.0 POSTABLATIVE HYPOTHYROIDISM: Primary | ICD-10-CM

## 2023-05-09 DIAGNOSIS — E04.2 MULTIPLE THYROID NODULES: ICD-10-CM

## 2023-05-09 DIAGNOSIS — E66.01 CLASS 2 SEVERE OBESITY WITH SERIOUS COMORBIDITY AND BODY MASS INDEX (BMI) OF 38.0 TO 38.9 IN ADULT, UNSPECIFIED OBESITY TYPE: ICD-10-CM

## 2023-05-09 DIAGNOSIS — R73.03 PREDIABETES: ICD-10-CM

## 2023-05-09 PROCEDURE — 1160F RVW MEDS BY RX/DR IN RCRD: CPT | Mod: CPTII,S$GLB,, | Performed by: INTERNAL MEDICINE

## 2023-05-09 PROCEDURE — 3074F SYST BP LT 130 MM HG: CPT | Mod: CPTII,S$GLB,, | Performed by: INTERNAL MEDICINE

## 2023-05-09 PROCEDURE — 4010F PR ACE/ARB THEARPY RXD/TAKEN: ICD-10-PCS | Mod: CPTII,S$GLB,, | Performed by: INTERNAL MEDICINE

## 2023-05-09 PROCEDURE — 99214 OFFICE O/P EST MOD 30 MIN: CPT | Mod: S$GLB,,, | Performed by: INTERNAL MEDICINE

## 2023-05-09 PROCEDURE — 1160F PR REVIEW ALL MEDS BY PRESCRIBER/CLIN PHARMACIST DOCUMENTED: ICD-10-PCS | Mod: CPTII,S$GLB,, | Performed by: INTERNAL MEDICINE

## 2023-05-09 PROCEDURE — 3079F DIAST BP 80-89 MM HG: CPT | Mod: CPTII,S$GLB,, | Performed by: INTERNAL MEDICINE

## 2023-05-09 PROCEDURE — 3079F PR MOST RECENT DIASTOLIC BLOOD PRESSURE 80-89 MM HG: ICD-10-PCS | Mod: CPTII,S$GLB,, | Performed by: INTERNAL MEDICINE

## 2023-05-09 PROCEDURE — 99999 PR PBB SHADOW E&M-EST. PATIENT-LVL III: CPT | Mod: PBBFAC,,, | Performed by: INTERNAL MEDICINE

## 2023-05-09 PROCEDURE — 4010F ACE/ARB THERAPY RXD/TAKEN: CPT | Mod: CPTII,S$GLB,, | Performed by: INTERNAL MEDICINE

## 2023-05-09 PROCEDURE — 99999 PR PBB SHADOW E&M-EST. PATIENT-LVL III: ICD-10-PCS | Mod: PBBFAC,,, | Performed by: INTERNAL MEDICINE

## 2023-05-09 PROCEDURE — 3008F PR BODY MASS INDEX (BMI) DOCUMENTED: ICD-10-PCS | Mod: CPTII,S$GLB,, | Performed by: INTERNAL MEDICINE

## 2023-05-09 PROCEDURE — 1159F MED LIST DOCD IN RCRD: CPT | Mod: CPTII,S$GLB,, | Performed by: INTERNAL MEDICINE

## 2023-05-09 PROCEDURE — 3008F BODY MASS INDEX DOCD: CPT | Mod: CPTII,S$GLB,, | Performed by: INTERNAL MEDICINE

## 2023-05-09 PROCEDURE — 1159F PR MEDICATION LIST DOCUMENTED IN MEDICAL RECORD: ICD-10-PCS | Mod: CPTII,S$GLB,, | Performed by: INTERNAL MEDICINE

## 2023-05-09 PROCEDURE — 3074F PR MOST RECENT SYSTOLIC BLOOD PRESSURE < 130 MM HG: ICD-10-PCS | Mod: CPTII,S$GLB,, | Performed by: INTERNAL MEDICINE

## 2023-05-09 PROCEDURE — 99214 PR OFFICE/OUTPT VISIT, EST, LEVL IV, 30-39 MIN: ICD-10-PCS | Mod: S$GLB,,, | Performed by: INTERNAL MEDICINE

## 2023-05-09 RX ORDER — LEVOTHYROXINE SODIUM 88 UG/1
TABLET ORAL
Qty: 96 TABLET | Refills: 3 | Status: SHIPPED | OUTPATIENT
Start: 2023-05-09

## 2023-05-09 NOTE — ASSESSMENT & PLAN NOTE
Last labs normal   clinically denies most thyroid related symptoms   continue levothyroxine 88 mcg/day, plus an extra 44 mcg on Sundays.   repeat labs from time to time. Or sooner if symptoms change.  Adjust dose as needed based on results. Goal is a normal TSH

## 2023-05-09 NOTE — PROGRESS NOTES
Subjective:      Chief Complaint: Hypothyroidism      HPI: Jaimie Goldberg is a 58 y.o. female who is here for a follow-up evaluation for thyroid. Last seen 12/22/2022. Had recommended f/u 12/2023. Pt here 7 months early.    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 S/p 34 mCi radioactive iodine 11/16/2017, now with post-ablative hypothyroidism.    Current medication:  levothyroxine  Current dose: 88 mcg daily, PLUS 44 mcg extra on Sundays.    Lab Results   Component Value Date    TSH 2.926 12/22/2022    FREET4 0.97 04/12/2018     For nodules:   Last US 5/1/2023   [facility].  Right sided nodules include:   0.8 cm TR3   0.7 cm TR3   0.5 cm TR3    Prior US 10/22/2020. [facility].    Has prediabetes  Lab Results   Component Value Date    HGBA1C 5.7 (H) 12/22/2022     For weight:   Tried phentermine, didn't try for it.    Symptoms  No   Yes  [x]    []  Weight  [x]    []  Fatigue  [x]    []  Constipation  [x]    []  Cold intolerance    [x]    []  Weight loss  [x]    []  Insomnia  [x]    []  Diarrhea  []    [x]  Heat intolerance. Occasional at night  [x]    []  Palpitations    Reviewed past medical, family, social history and updated as appropriate.    Review of Systems  As above    Objective:     Vitals:    05/09/23 1143   BP: 118/80   Pulse: 87   Temp: 97.8 °F (36.6 °C)       BP Readings from Last 5 Encounters:   05/09/23 118/80   12/22/22 129/72   02/04/22 128/78   07/19/21 (!) 133/92   09/02/20 (!) 142/80       Physical Exam  Vitals reviewed.   Constitutional:       General: She is not in acute distress.     Appearance: She is obese.   Cardiovascular:      Heart sounds: Normal heart sounds.   Pulmonary:      Effort: Pulmonary effort is normal.       Wt Readings from Last 10 Encounters:   05/09/23 1143 111.4 kg (245 lb 9.5 oz)   12/22/22 1546 110.8 kg (244 lb 4.3 oz)   02/04/22 1316 111.8 kg (246 lb 9.4  oz)   07/19/21 1354 110.3 kg (243 lb 4.4 oz)   09/02/20 1401 101 kg (222 lb 10.6 oz)   07/16/19 1339 100.8 kg (222 lb 1.8 oz)   07/12/18 1515 99.3 kg (218 lb 14.7 oz)   01/02/18 1446 97.5 kg (214 lb 15.2 oz)   09/26/17 1547 95.9 kg (211 lb 6.7 oz)       Lab Results   Component Value Date    HGBA1C 5.7 (H) 12/22/2022     Lab Results   Component Value Date    CHOL 200 (H) 07/22/2021    HDL 68 07/22/2021    LDLCALC 113.4 07/22/2021    TRIG 93 07/22/2021    CHOLHDL 34.0 07/22/2021     Lab Results   Component Value Date     07/22/2021    K 3.2 (L) 07/22/2021     07/22/2021    CO2 23 07/22/2021    GLU 86 07/22/2021    BUN 11 07/22/2021    CREATININE 0.8 07/22/2021    CALCIUM 8.9 07/22/2021    PROT 7.1 07/22/2021    ALBUMIN 3.7 07/22/2021    BILITOT 0.5 07/22/2021    ALKPHOS 48 (L) 07/22/2021    AST 23 07/22/2021    ALT 18 07/22/2021    ANIONGAP 12 07/22/2021    ESTGFRAFRICA >60.0 07/22/2021    EGFRNONAA >60.0 07/22/2021    TSH 2.926 12/22/2022        Assessment/Plan:     Postablative hypothyroidism  Last labs normal   clinically denies most thyroid related symptoms   continue levothyroxine 88 mcg/day, plus an extra 44 mcg on Sundays.   repeat labs from time to time. Or sooner if symptoms change.  Adjust dose as needed based on results. Goal is a normal TSH    Prediabetes  Still in prediabetes on last labs. Stable   monitor from time to time      Multiple thyroid nodules  Small nodules in 2020, seen again in 2023, similar/smaller.   no compressive symptoms  Recheck US after 3-5 years. Or sooner if symptoms change        Class 2 severe obesity with serious comorbidity and body mass index (BMI) of 38.0 to 38.9 in adult  Body mass index is 38.47 kg/m².   complicated by HTN, HLD, prediabetes   encourage pt to work on healthy diet, increase exercise as tolerated.     Prescribed phentermine + topiramate, pt didn't start, not interested in medication   monitor weight      Follow up in about 1 year (around 5/9/2024)  for further monitoring, lab review. Or as needed      Medhat Krishnamurthy MD  Endocrinology

## 2023-05-09 NOTE — ASSESSMENT & PLAN NOTE
Small nodules in 2020, seen again in 2023, similar/smaller.   no compressive symptoms  Recheck US after 3-5 years. Or sooner if symptoms change

## 2023-05-09 NOTE — ASSESSMENT & PLAN NOTE
Body mass index is 38.47 kg/m².   complicated by HTN, HLD, prediabetes   encourage pt to work on healthy diet, increase exercise as tolerated.     Prescribed phentermine + topiramate, pt didn't start, not interested in medication   monitor weight

## 2025-02-06 ENCOUNTER — TELEPHONE (OUTPATIENT)
Dept: ENDOCRINOLOGY | Facility: CLINIC | Age: 60
End: 2025-02-06
Payer: COMMERCIAL

## 2025-02-06 NOTE — TELEPHONE ENCOUNTER
Spoke with patient scheduled visit. Patient approved time and date.    ----- Message from Dafne sent at 2/6/2025 12:40 PM CST -----  Type:  Sooner Appointment Request    Caller is requesting a sooner appointment.    Name of Caller:Ms Goldberg   When is the first available appointment?none   Symptoms:states she has racing of the heart   Would the patient rather a call back or a response via [facility]? Call   Best Call Back Number: [phone]  Additional Information: she was seeing Dr Pineda and now she wants to schedule an appt to see Dr Buchanan for her symptoms please call

## 2025-02-10 ENCOUNTER — OFFICE VISIT (OUTPATIENT)
Dept: ENDOCRINOLOGY | Facility: CLINIC | Age: 60
End: 2025-02-10
Payer: COMMERCIAL

## 2025-02-10 ENCOUNTER — LAB VISIT (OUTPATIENT)
Dept: LAB | Facility: HOSPITAL | Age: 60
End: 2025-02-10
Attending: STUDENT IN AN ORGANIZED HEALTH CARE EDUCATION/TRAINING PROGRAM
Payer: COMMERCIAL

## 2025-02-10 VITALS
OXYGEN SATURATION: 97 % | SYSTOLIC BLOOD PRESSURE: 154 MMHG | HEIGHT: 67 IN | RESPIRATION RATE: 18 BRPM | WEIGHT: 252 LBS | HEART RATE: 73 BPM | DIASTOLIC BLOOD PRESSURE: 96 MMHG | BODY MASS INDEX: 39.55 KG/M2

## 2025-02-10 DIAGNOSIS — E89.0 POSTABLATIVE HYPOTHYROIDISM: ICD-10-CM

## 2025-02-10 DIAGNOSIS — E04.2 MULTIPLE THYROID NODULES: ICD-10-CM

## 2025-02-10 DIAGNOSIS — E89.0 POSTABLATIVE HYPOTHYROIDISM: Primary | ICD-10-CM

## 2025-02-10 LAB — TSH SERPL DL<=0.005 MIU/L-ACNC: 1.94 UIU/ML (ref 0.4–4)

## 2025-02-10 PROCEDURE — 84443 ASSAY THYROID STIM HORMONE: CPT | Performed by: STUDENT IN AN ORGANIZED HEALTH CARE EDUCATION/TRAINING PROGRAM

## 2025-02-10 PROCEDURE — 99999 PR PBB SHADOW E&M-EST. PATIENT-LVL IV: CPT | Mod: PBBFAC,,, | Performed by: STUDENT IN AN ORGANIZED HEALTH CARE EDUCATION/TRAINING PROGRAM

## 2025-02-10 PROCEDURE — 36415 COLL VENOUS BLD VENIPUNCTURE: CPT | Performed by: STUDENT IN AN ORGANIZED HEALTH CARE EDUCATION/TRAINING PROGRAM

## 2025-02-10 RX ORDER — LEVOTHYROXINE SODIUM 88 UG/1
TABLET ORAL
Qty: 96 TABLET | Refills: 3 | Status: CANCELLED | OUTPATIENT
Start: 2025-02-10

## 2025-02-10 NOTE — PROGRESS NOTES
Endocrinology Return Visit  02/10/2025    Subjective:      Chief Complaint: Hypothyroidism      HPI: Jaimie Goldberg is a 60 y.o. female who is here for a follow-up evaluation for thyroid.     Last seen by Dr Krishnamurthy in 5/2023. No changes were made at that time.      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 S/p 34 mCi radioactive iodine 11/16/2017, now with post-ablative hypothyroidism.    Current medication:  levothyroxine (Synthroid brand)  Current dose: 88 mcg daily, PLUS 44 mcg extra on Sundays.  Takes in the morning by itself without food or other meds  Denies missing doses     Lab Results   Component Value Date    TSH 4.923 06/19/2024    FREET4 0.97 04/12/2018     +Palpitations when she feeds the dogs -- this began 7-8mo ago.  +weight gain  +fatigue when walking around [employer] or when she feeds the dogs  Denies constipation    HRT: not taking this      For nodules:  Plan to recheck thyroid US 3-5yrs after last scan in 5/2023 due to stability on imaging of subcentimeter nodules.     Last US 5/1/2023   [facility].  Right sided nodules include:   0.8 cm TR3   0.7 cm TR3   0.5 cm TR3    Prior US 10/22/2020. [facility].    Denies compressive symptoms.      Has prediabetes Monitored by PCP  Lab Results   Component Value Date    HGBA1C 6.2 (H) 09/17/2024     Taking Simvastatin 40 mg daily.      For obesity:  In the past phentermine-topiramate prescribed, but she did not try this med.    Symptoms  No   Yes  []    [x]  Weight gain  []    [x]  Fatigue  [x]    []  Constipation  [x]    []  Cold intolerance    [x]    []  Weight loss  [x]    []  Insomnia  [x]    []  Diarrhea  []    [x]  Heat intolerance. Occasional at night  [x]    []  Palpitations    Reviewed past medical, family, social history and updated as appropriate.      ROS: As above     Objective:     Vitals:    02/10/25 1134   BP: (!) 154/96   Pulse: 73    Resp: 18     BP Readings from Last 5 Encounters:   02/10/25 (!) 154/96   05/09/23 118/80   12/22/22 129/72   02/04/22 128/78   07/19/21 (!) 133/92       Physical Exam  Vitals reviewed.   Constitutional:       General: She is not in acute distress.     Appearance: She is obese. She is not ill-appearing.   HENT:      Head: Normocephalic.      Mouth/Throat:      Mouth: Mucous membranes are moist.   Neck:      Thyroid: No thyroid mass, thyromegaly or thyroid tenderness.   Cardiovascular:      Heart sounds: Normal heart sounds.   Musculoskeletal:      Cervical back: Normal range of motion and neck supple.   Lymphadenopathy:      Cervical: No cervical adenopathy.   Psychiatric:         Mood and Affect: Mood normal.         Behavior: Behavior normal.         Wt Readings from Last 10 Encounters:   05/09/23 1143 111.4 kg (245 lb 9.5 oz)   12/22/22 1546 110.8 kg (244 lb 4.3 oz)   02/04/22 1316 111.8 kg (246 lb 9.4 oz)   07/19/21 1354 110.3 kg (243 lb 4.4 oz)   09/02/20 1401 101 kg (222 lb 10.6 oz)   07/16/19 1339 100.8 kg (222 lb 1.8 oz)   07/12/18 1515 99.3 kg (218 lb 14.7 oz)   01/02/18 1446 97.5 kg (214 lb 15.2 oz)   09/26/17 1547 95.9 kg (211 lb 6.7 oz)       Lab Results   Component Value Date    HGBA1C 6.2 (H) 09/17/2024     Lab Results   Component Value Date    CHOL 200 (H) 07/22/2021    HDL 68 07/22/2021    LDLCALC 113.4 07/22/2021    TRIG 93 07/22/2021    CHOLHDL 34.0 07/22/2021     Lab Results   Component Value Date     07/22/2021    K 3.2 (L) 07/22/2021     07/22/2021    CO2 23 07/22/2021    GLU 86 07/22/2021    BUN 11 07/22/2021    CREATININE 0.8 07/22/2021    CALCIUM 8.9 07/22/2021    PROT 7.1 07/22/2021    ALBUMIN 3.7 07/22/2021    BILITOT 0.5 07/22/2021    ALKPHOS 48 (L) 07/22/2021    AST 23 07/22/2021    ALT 18 07/22/2021    ANIONGAP 12 07/22/2021    ESTGFRAFRICA >60.0 07/22/2021    EGFRNONAA >60.0 07/22/2021    TSH 4.923 06/19/2024        Assessment/Plan:     1. Postablative  hypothyroidism  Assessment & Plan:  Last labs normal  clinically denies most thyroid related symptoms although now having palpitations - we discussed that we will recheck TSH. If we are over-treating with LT4 that can cause palpitations but as long as TSH is normal, it is not  the cause of her symptoms and she should see her PCP to evaluate for non-endocrine causes of palpitations   continue levothyroxine 88 mcg/day, plus an extra 44 mcg on Sundays for now -- check TSH today and adjust dose as needed based on results. Goal is a normal TSH. After her labs result, send refill of Synthroid to her pharmacy    Orders:  -     TSH; Future; Expected date: 02/10/2025    2. Multiple thyroid nodules  Assessment & Plan:  Small nodules in 2020, seen again in 2023, similar/smaller.  no compressive symptoms  Recheck US after 3-5 years. Or sooner if symptoms change  Soonest she will be due to repeat US is in 5/2026          TSH today  Recall for 1yr     Windy Moreno MD  Endocrinology    Visit today included increased complexity associated with the care of the problems addressed and managing the longitudinal care of the patient due to the serious and/or complex managed problems

## 2025-02-10 NOTE — ASSESSMENT & PLAN NOTE
Small nodules in 2020, seen again in 2023, similar/smaller.  no compressive symptoms  Recheck US after 3-5 years. Or sooner if symptoms change  Soonest she will be due to repeat US is in 5/2026

## 2025-02-10 NOTE — PATIENT INSTRUCTIONS
Continue the same dose of levothyroxine 88 mcg daily + an extra 1/2 tab (44 mcg) on Sunday    As a review, it is best to take the levothyroxine in the morning on an empty stomach, and not eat, drink or take medications for at least ~30 minutes after taking it to maximize its absorption.  Please avoid taking any multi-vitamins (anything with iron) or calcium supplements for at least 4 hours after taking the medication.  If you miss the dose on one day, take two doses the next morning to make up for the missed dose    We will check labs today. Our goal is a normal TSH.     I will send a refill of Synthroid after your lab results.     We will repeat the ultrasound of the thyroid in May 2026. (3 yrs from the last one)    Follow up with me in 1 year. We schedule appointments 4 months in advance. This means call us several months before your appt is due to schedule it.

## 2025-02-10 NOTE — ASSESSMENT & PLAN NOTE
Last labs normal  clinically denies most thyroid related symptoms although now having palpitations - we discussed that we will recheck TSH. If we are over-treating with LT4 that can cause palpitations but as long as TSH is normal, it is not  the cause of her symptoms and she should see her PCP to evaluate for non-endocrine causes of palpitations   continue levothyroxine 88 mcg/day, plus an extra 44 mcg on Sundays for now -- check TSH today and adjust dose as needed based on results. Goal is a normal TSH. After her labs result, send refill of Synthroid to her pharmacy

## 2025-02-11 DIAGNOSIS — E89.0 POSTABLATIVE HYPOTHYROIDISM: ICD-10-CM

## 2025-02-11 RX ORDER — LEVOTHYROXINE SODIUM 88 UG/1
TABLET ORAL
Qty: 96 TABLET | Refills: 3 | Status: SHIPPED | OUTPATIENT
Start: 2025-02-11

## 2025-03-10 ENCOUNTER — PATIENT MESSAGE (OUTPATIENT)
Dept: ENDOCRINOLOGY | Facility: CLINIC | Age: 60
End: 2025-03-10
Payer: COMMERCIAL